# Patient Record
Sex: MALE | Race: WHITE | Employment: OTHER | URBAN - METROPOLITAN AREA
[De-identification: names, ages, dates, MRNs, and addresses within clinical notes are randomized per-mention and may not be internally consistent; named-entity substitution may affect disease eponyms.]

---

## 2017-03-27 ENCOUNTER — OFFICE VISIT (OUTPATIENT)
Dept: CARDIOLOGY CLINIC | Age: 77
End: 2017-03-27

## 2017-03-27 VITALS
SYSTOLIC BLOOD PRESSURE: 119 MMHG | BODY MASS INDEX: 29.25 KG/M2 | WEIGHT: 182 LBS | HEIGHT: 66 IN | HEART RATE: 53 BPM | DIASTOLIC BLOOD PRESSURE: 50 MMHG

## 2017-03-27 DIAGNOSIS — Z95.1 POSTSURGICAL AORTOCORONARY BYPASS STATUS: ICD-10-CM

## 2017-03-27 DIAGNOSIS — I25.10 CORONARY ARTERY DISEASE INVOLVING NATIVE CORONARY ARTERY OF NATIVE HEART WITHOUT ANGINA PECTORIS: Primary | ICD-10-CM

## 2017-03-27 DIAGNOSIS — I10 ESSENTIAL HYPERTENSION: ICD-10-CM

## 2017-03-27 DIAGNOSIS — E78.5 HYPERLIPIDEMIA, UNSPECIFIED HYPERLIPIDEMIA TYPE: ICD-10-CM

## 2017-03-27 DIAGNOSIS — Z98.61 POSTSURGICAL PERCUTANEOUS TRANSLUMINAL CORONARY ANGIOPLASTY STATUS: ICD-10-CM

## 2017-03-27 NOTE — PROGRESS NOTES
HISTORY OF PRESENT ILLNESS  Cheli Paulino is a 78 y.o. male. HPI Comments: Patient with cad,post cabg and pci,htn. On follow up patient denies any chest pains,sob, palpitation or other significant symptoms. Patient has numbness in left leg-neurology evaluation in progress. Valvular Heart Disease   Pertinent negatives include no chest pain and no shortness of breath. Review of Systems   Constitutional: Negative for chills, fever and malaise/fatigue. HENT: Negative for nosebleeds. Eyes: Negative for blurred vision and double vision. Respiratory: Negative for cough, hemoptysis, sputum production, shortness of breath and wheezing. Cardiovascular: Negative for chest pain, palpitations, orthopnea, claudication, leg swelling and PND. Gastrointestinal: Negative for heartburn, nausea and vomiting. Musculoskeletal: Negative for myalgias. Skin: Negative for rash. Neurological: Negative for dizziness and weakness. Endo/Heme/Allergies: Does not bruise/bleed easily.      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 failure     bp stable    CAD (coronary artery disease)     4 way bypass    Cough     Decreased hearing     right ear    Depression     Diabetes ([address]     Fatigue 9/5/2013    severe fatigue and sob for past few weeks r/o ichemia,cmp,anemia     HLD (hyperlipidemia)     HTN (hypertension)     Hypercholesterolemia     Hyperparathyroidism ([address]     Insomnia     Joint pain     Kidney stones     Low back pain     Lumbago     Lumbar spinal stenosis     Mild acid reflux     Mitral valve disorder     Obesity, unspecified     Patine has increased weight    Poliomyelitis     as a child with resulting right hand deformity    Postsurgical aortocoronary bypass status     Postsurgical percutaneous transluminal coronary angioplasty status     Stent of vein graft to RCA with YOSVANY/4/2008    Psychosexual dysfunction     Rosacea     stroke     x2  Jan 2006    Tricuspid valve disease     Vitamin D deficiency        Past Surgical History:   Procedure Laterality Date    HX APPENDECTOMY      HX CORONARY ARTERY BYPASS GRAFT      Four-vessel; and subsequent stents    HX CORONARY STENT PLACEMENT      Stent:VG to RCA and RT PDA beyond that/4/2008    HX HERNIA REPAIR      x3 left and right groin and epigastric area       Allergies   Allergen Reactions    Lisinopril Not Reported This Time    Morphine Other (comments)     GI distress    Niacin Rash and Itching    Zetia [Ezetimibe] Other (comments)     Acid reflux    Zocor [Simvastatin] Not Reported This Time       Current Outpatient Prescriptions   Medication Sig    cephALEXin (KEFLEX) 500 mg capsule Take 500 mg by mouth two (2) times a day.  traMADol (ULTRAM) 50 mg tablet Take 1 Tab by mouth two (2) times daily as needed for Pain (1 tab po bid prn pain greater than 5/10.). Max Daily Amount: 100 mg.    omega-3 fatty acids-vitamin e 1,000 mg cap 1,000 mg.  cholecalciferol, vitamin D3, (VITAMIN D3) 2,000 unit tab Take  by mouth daily.  aspirin 81 mg tablet Take 81 mg by mouth daily.  nebivolol (BYSTOLIC) 10 mg tablet Take 5 mg by mouth daily.  rosuvastatin (CRESTOR) 20 mg tablet Take 20 mg by mouth nightly.  famotidine (PEPCID) 40 mg tablet Take 40 mg by mouth as needed.  DOCOSAHEXANOIC ACID/EPA (FISH OIL PO) Take 1,000 mg by mouth two (2) times a day.  gabapentin (NEURONTIN) 300 mg tablet Take  by mouth three (3) times daily.  clopidogrel (PLAVIX) 75 mg tablet Take  by mouth daily. No current facility-administered medications for this visit.         Visit Vitals    /50    Pulse (!) 53    Ht 5' 6\" (1.676 m)    Wt 82.6 kg (182 lb)    BMI 29.38 kg/m2         Physical Exam   Constitutional: He is oriented to person, place, and time. He appears well-developed and well-nourished. obese   HENT:   Head: Normocephalic and atraumatic. Eyes: Conjunctivae are normal.   Neck: Neck supple. No JVD present. No tracheal deviation present. No thyromegaly present. Cardiovascular: Normal rate, regular rhythm and normal heart sounds. Exam reveals no gallop and no friction rub. No murmur heard. Pulmonary/Chest: Breath sounds normal. No respiratory distress. He has no wheezes. He has no rales. He exhibits no tenderness. Abdominal: Soft. There is no tenderness. Musculoskeletal: He exhibits no edema. Neurological: He is alert and oriented to person, place, and time. Skin: Skin is warm and dry. Psychiatric: He has a normal mood and affect.      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 failure     bp stable    CAD (coronary artery disease)     4 way bypass    Cough     Decreased hearing     right ear    Depression     Diabetes ([address]     Fatigue 9/5/2013    severe fatigue and sob for past few weeks r/o ichemia,cmp,anemia     HLD (hyperlipidemia)     HTN (hypertension)     Hypercholesterolemia     Hyperparathyroidism ([address]     Insomnia     Joint pain     Kidney stones     Low back pain     Lumbago     Lumbar spinal stenosis     Mild acid reflux     Mitral valve disorder     Obesity, unspecified     Patine has increased weight    Poliomyelitis     as a child with resulting right hand deformity    Postsurgical aortocoronary bypass status     Postsurgical percutaneous transluminal coronary angioplasty status     Stent of vein graft to RCA with YOSVANY/4/2008    Psychosexual dysfunction     Rosacea     stroke     x2  Jan 2006    Tricuspid valve disease     Vitamin D deficiency Past Surgical History:   Procedure Laterality Date    HX APPENDECTOMY      HX CORONARY ARTERY BYPASS GRAFT      Four-vessel; and subsequent stents    HX CORONARY STENT PLACEMENT      Stent:VG to RCA and RT PDA beyond that/4/2008    HX HERNIA REPAIR      x3 left and right groin and epigastric area       Social History   Substance Use Topics    Smoking status: Former Smoker     Packs/day: 1.00     Years: 24.00     Quit date: 1/1/1985    Smokeless tobacco: Never Used    Alcohol use 6.0 oz/week     12 Cans of beer per week       Allergies   Allergen Reactions    Lisinopril Not Reported This Time    Morphine Other (comments)     GI distress    Niacin Rash and Itching    Zetia [Ezetimibe] Other (comments)     Acid reflux    Zocor [Simvastatin] Not Reported This Time       Current Outpatient Prescriptions   Medication Sig    cephALEXin (KEFLEX) 500 mg capsule Take 500 mg by mouth two (2) times a day.  traMADol (ULTRAM) 50 mg tablet Take 1 Tab by mouth two (2) times daily as needed for Pain (1 tab po bid prn pain greater than 5/10.). Max Daily Amount: 100 mg.    omega-3 fatty acids-vitamin e 1,000 mg cap 1,000 mg.  cholecalciferol, vitamin D3, (VITAMIN D3) 2,000 unit tab Take  by mouth daily.  aspirin 81 mg tablet Take 81 mg by mouth daily.  nebivolol (BYSTOLIC) 10 mg tablet Take 5 mg by mouth daily.  rosuvastatin (CRESTOR) 20 mg tablet Take 20 mg by mouth nightly.  famotidine (PEPCID) 40 mg tablet Take 40 mg by mouth as needed.  DOCOSAHEXANOIC ACID/EPA (FISH OIL PO) Take 1,000 mg by mouth two (2) times a day.  gabapentin (NEURONTIN) 300 mg tablet Take  by mouth three (3) times daily.  clopidogrel (PLAVIX) 75 mg tablet Take  by mouth daily. No current facility-administered medications for this visit. Mr. Bari Gutierrez has a reminder for a \"due or due soon\" health maintenance.  I have asked that he contact his primary care provider for follow-up on this health maintenance. CARDIOLOGY STUDIES 9/9/2013 6/1/2010 4/1/2008 1/1/2007 1/1/2006   Myocardial Perfusion Scan Result prob normal,inf fixed with normal wall motion and ef - - - -   Cardiac Cath Result - - - - Bypass x's 4   Cardiac Cath with PCI Result - - LVEDP 15, Taxus stent in saphenous vein graft to RCA posterior descending artery in prox segment - -   Exercise Nuclear Stress Test Result - probably normal, mild fixed inf-basal s/o attenuation - - -   Echocardiogram - Complete Result normal ef,dd,trace to mild mr,tr - - normal ef, negative bubble study -         Assessment       ICD-10-CM ICD-9-CM    1. Coronary artery disease involving native coronary artery of native heart without angina pectoris I25.10 414.01     stable   2. Essential hypertension I10 401.9     controlled   3. Postsurgical aortocoronary bypass status Z95.1 V45.81     stable   4. Hyperlipidemia, unspecified hyperlipidemia type E78.5 272.4     stable   5. Postsurgical percutaneous transluminal coronary angioplasty status Z98.61 V45.82        Medications Discontinued During This Encounter   Medication Reason    traMADol (ULTRAM) 50 mg tablet Duplicate Order       No orders of the defined types were placed in this encounter. Follow-up Disposition:  Return in about 6 months (around 9/27/2017).

## 2017-03-27 NOTE — PROGRESS NOTES
1. Have you been to the ER, urgent care clinic since your last visit? Hospitalized since your last visit?     no  2. Have you seen or consulted any other health care providers outside of the [address] [place] [address] since your last visit? Include any pap smears or colon screening. Yes Where: pcp     3. Since your last visit, have you had any of the following symptoms? no       4. Have you had any blood work, X-rays or cardiac testing? No       5. Where do you normally have your labs drawn?   pcp  6. Do you need any refills today?    no

## 2017-03-27 NOTE — LETTER
Bhargavi Hernandez 1940 
 
3/27/2017 Dear DO Kendal Walker MD 
 
I had the pleasure of evaluating  Mr. Wojciech Hernandez in office today. Below are the relevant portions of my assessment and plan of care. ICD-10-CM ICD-9-CM 1. Coronary artery disease involving native coronary artery of native heart without angina pectoris I25.10 414.01   
 stable 2. Essential hypertension I10 401.9   
 controlled 3. Postsurgical aortocoronary bypass status Z95.1 V45.81   
 stable 4. Hyperlipidemia, unspecified hyperlipidemia type E78.5 272.4   
 stable 5. Postsurgical percutaneous transluminal coronary angioplasty status Z98.61 V45.82 Current Outpatient Prescriptions Medication Sig Dispense Refill  cephALEXin (KEFLEX) 500 mg capsule Take 500 mg by mouth two (2) times a day.  traMADol (ULTRAM) 50 mg tablet Take 1 Tab by mouth two (2) times daily as needed for Pain (1 tab po bid prn pain greater than 5/10.). Max Daily Amount: 100 mg. 30 Tab 0  
 omega-3 fatty acids-vitamin e 1,000 mg cap 1,000 mg.  cholecalciferol, vitamin D3, (VITAMIN D3) 2,000 unit tab Take  by mouth daily.  aspirin 81 mg tablet Take 81 mg by mouth daily.  nebivolol (BYSTOLIC) 10 mg tablet Take 5 mg by mouth daily.  rosuvastatin (CRESTOR) 20 mg tablet Take 20 mg by mouth nightly.  famotidine (PEPCID) 40 mg tablet Take 40 mg by mouth as needed.  DOCOSAHEXANOIC ACID/EPA (FISH OIL PO) Take 1,000 mg by mouth two (2) times a day.  gabapentin (NEURONTIN) 300 mg tablet Take  by mouth three (3) times daily.  clopidogrel (PLAVIX) 75 mg tablet Take  by mouth daily. No orders of the defined types were placed in this encounter. If you have questions, please do not hesitate to call me. I look forward to following Mr. Wojciech Hernnadez along with you. Sincerely, Salazar Camarillo MD

## 2017-03-27 NOTE — MR AVS SNAPSHOT
Visit Information Date & Time Provider Department Dept. Phone Encounter #  
 3/27/2017  8:30 AM Jasper Warner MD [facility] [place] [id number] Follow-up Instructions Return in about 6 months (around 9/27/2017). Upcoming Health Maintenance Date Due DTaP/Tdap/Td series (1 - Tdap) 9/3/1963 FOBT Q 1 YEAR AGE 50-75 9/3/1992 ZOSTER VACCINE AGE 60> 9/3/2002 GLAUCOMA SCREENING Q2Y 9/3/2007 Pneumococcal 65+ Low/Medium Risk (1 of 2 - PCV13) 9/3/2007 MEDICARE YEARLY EXAM 9/3/2007 INFLUENZA AGE 9 TO ADULT 8/1/2016 Allergies as of 3/27/2017  Review Complete On: 3/27/2017 By: Jasper Warner MD  
  
 Severity Noted Reaction Type Reactions Lisinopril    Not Reported This Time Morphine    Other (comments) GI distress Niacin    Rash, Itching Zetia [Ezetimibe]    Other (comments) Acid reflux Zocor [Simvastatin]    Not Reported This Time Current Immunizations  Never Reviewed No immunizations on file. Not reviewed this visit You Were Diagnosed With   
  
 Codes Comments Coronary artery disease involving native coronary artery of native heart without angina pectoris    -  Primary ICD-10-CM: I25.10 ICD-9-CM: 414.01 stable Essential hypertension     ICD-10-CM: I10 
ICD-9-CM: 401.9 controlled Postsurgical aortocoronary bypass status     ICD-10-CM: Z95.1 ICD-9-CM: V45.81 stable Hyperlipidemia, unspecified hyperlipidemia type     ICD-10-CM: E78.5 ICD-9-CM: 272.4 stable Postsurgical percutaneous transluminal coronary angioplasty status     ICD-10-CM: Z98.61 ICD-9-CM: V45.82 Vitals BP Pulse Height(growth percentile) Weight(growth percentile) BMI Smoking Status 119/50 (!) 53 5' 6\" (1.676 m) 182 lb (82.6 kg) 29.38 kg/m2 Former Smoker Vitals History BMI and BSA Data Body Mass Index Body Surface Area  
 29.38 kg/m 2 1.96 m 2 Preferred Pharmacy Pharmacy Name Phone [facility] [address] [place] [address] Your Updated Medication List  
  
   
This list is accurate as of: 3/27/17  8:39 AM.  Always use your most recent med list.  
  
  
  
  
 aspirin 81 mg tablet Take 81 mg by mouth daily. BYSTOLIC 10 mg tablet Generic drug:  nebivolol Take 5 mg by mouth daily. cephALEXin 500 mg capsule Commonly known as:  Jairo Aury Take 500 mg by mouth two (2) times a day. CRESTOR 20 mg tablet Generic drug:  rosuvastatin Take 20 mg by mouth nightly. famotidine 40 mg tablet Commonly known as:  PEPCID Take 40 mg by mouth as needed. FISH OIL PO Take 1,000 mg by mouth two (2) times a day.  
  
 gabapentin 300 mg tablet Commonly known as:  NEURONTIN Take  by mouth three (3) times daily. omega-3 fatty acids-vitamin e 1,000 mg Cap  
1,000 mg. PLAVIX 75 mg Tab Generic drug:  clopidogrel Take  by mouth daily. traMADol 50 mg tablet Commonly known as:  ULTRAM  
Take 1 Tab by mouth two (2) times daily as needed for Pain (1 tab po bid prn pain greater than 5/10.). Max Daily Amount: 100 mg. VITAMIN D3 2,000 unit Tab Generic drug:  cholecalciferol (vitamin D3) Take  by mouth daily. Follow-up Instructions Return in about 6 months (around 9/27/2017). [facility] & HEALTH SERVICES! Dear Shalonda Felipe: Thank you for requesting a Milyoni account. Our records indicate that you already have an active Milyoni account. You can access your account anytime at [email or web address] Did you know that you can access your hospital and ER discharge instructions at any time in Milyoni? You can also review all of your test results from your hospital stay or ER visit. Additional Information If you have questions, please visit the Frequently Asked Questions section of the Milyoni website at [email or web address] Remember, [employer] is NOT to be used for urgent needs. For medical emergencies, dial 911. Now available from your iPhone and Android! Please provide this summary of care documentation to your next provider. Your primary care clinician is listed as Donita Walker. If you have any questions after today's visit, please call [phone].

## 2017-09-26 ENCOUNTER — OFFICE VISIT (OUTPATIENT)
Dept: CARDIOLOGY CLINIC | Age: 77
End: 2017-09-26

## 2017-09-26 VITALS
SYSTOLIC BLOOD PRESSURE: 127 MMHG | HEART RATE: 64 BPM | WEIGHT: 184 LBS | DIASTOLIC BLOOD PRESSURE: 57 MMHG | HEIGHT: 66 IN | BODY MASS INDEX: 29.57 KG/M2

## 2017-09-26 DIAGNOSIS — I25.10 CORONARY ARTERY DISEASE INVOLVING NATIVE CORONARY ARTERY OF NATIVE HEART WITHOUT ANGINA PECTORIS: Primary | ICD-10-CM

## 2017-09-26 DIAGNOSIS — E78.5 HYPERLIPIDEMIA, UNSPECIFIED HYPERLIPIDEMIA TYPE: ICD-10-CM

## 2017-09-26 DIAGNOSIS — Z98.61 POSTSURGICAL PERCUTANEOUS TRANSLUMINAL CORONARY ANGIOPLASTY STATUS: ICD-10-CM

## 2017-09-26 DIAGNOSIS — Z95.1 POSTSURGICAL AORTOCORONARY BYPASS STATUS: ICD-10-CM

## 2017-09-26 DIAGNOSIS — I10 ESSENTIAL HYPERTENSION: ICD-10-CM

## 2017-09-26 RX ORDER — LOSARTAN POTASSIUM 100 MG/1
100 TABLET ORAL DAILY
COMMUNITY

## 2017-09-26 NOTE — LETTER
Sahra Romeo 1940 
 
9/26/2017 Dear DO Robby Gaspar MD 
 
I had the pleasure of evaluating  Mr. Garrick Contreras in office today. Below are the relevant portions of my assessment and plan of care. ICD-10-CM ICD-9-CM 1. Coronary artery disease involving native coronary artery of native heart without angina pectoris I25.10 414.01   
 stable 2. Essential hypertension I10 401.9   
 controlled 3. Postsurgical aortocoronary bypass status Z95.1 V45.81   
4. Hyperlipidemia, unspecified hyperlipidemia type E78.5 272.4   
 stable 
lab done with pcp 5. Postsurgical percutaneous transluminal coronary angioplasty status Z98.61 V45.82   
 stable Current Outpatient Prescriptions Medication Sig Dispense Refill  losartan (COZAAR) 100 mg tablet Take 100 mg by mouth daily.  cephALEXin (KEFLEX) 500 mg capsule Take 500 mg by mouth two (2) times a day.  traMADol (ULTRAM) 50 mg tablet Take 1 Tab by mouth two (2) times daily as needed for Pain (1 tab po bid prn pain greater than 5/10.). Max Daily Amount: 100 mg. 30 Tab 0  
 omega-3 fatty acids-vitamin e 1,000 mg cap 1,000 mg.  cholecalciferol, vitamin D3, (VITAMIN D3) 2,000 unit tab Take  by mouth daily.  aspirin 81 mg tablet Take 81 mg by mouth daily.  nebivolol (BYSTOLIC) 10 mg tablet Take 5 mg by mouth daily.  rosuvastatin (CRESTOR) 20 mg tablet Take 20 mg by mouth nightly.  gabapentin (NEURONTIN) 300 mg tablet Take  by mouth three (3) times daily.  clopidogrel (PLAVIX) 75 mg tablet Take  by mouth daily. Orders Placed This Encounter  losartan (COZAAR) 100 mg tablet Sig: Take 100 mg by mouth daily. If you have questions, please do not hesitate to call me. I look forward to following Mr. Garrick Contreras along with you. Sincerely, Michael Werner MD

## 2017-09-26 NOTE — MR AVS SNAPSHOT
Visit Information Date & Time Provider Department Dept. Phone Encounter #  
 9/26/2017  8:30 AM Clarissa Marcelo MD [facility] [place] [id number] Follow-up Instructions Return in about 6 months (around 3/26/2018). Upcoming Health Maintenance Date Due DTaP/Tdap/Td series (1 - Tdap) 9/3/1963 FOBT Q 1 YEAR AGE 50-75 9/3/1992 ZOSTER VACCINE AGE 60> 7/3/2002 GLAUCOMA SCREENING Q2Y 9/3/2007 Pneumococcal 65+ Low/Medium Risk (1 of 2 - PCV13) 9/3/2007 MEDICARE YEARLY EXAM 9/3/2007 INFLUENZA AGE 9 TO ADULT 8/1/2017 Allergies as of 9/26/2017  Review Complete On: 9/26/2017 By: Clarissa Marcelo MD  
  
 Severity Noted Reaction Type Reactions Lisinopril    Not Reported This Time Morphine    Other (comments) GI distress Niacin    Rash, Itching Zetia [Ezetimibe]    Other (comments) Acid reflux Zocor [Simvastatin]    Not Reported This Time Current Immunizations  Never Reviewed No immunizations on file. Not reviewed this visit You Were Diagnosed With   
  
 Codes Comments Coronary artery disease involving native coronary artery of native heart without angina pectoris    -  Primary ICD-10-CM: I25.10 ICD-9-CM: 414.01 stable Essential hypertension     ICD-10-CM: I10 
ICD-9-CM: 401.9 controlled Postsurgical aortocoronary bypass status     ICD-10-CM: Z95.1 ICD-9-CM: V45.81 Hyperlipidemia, unspecified hyperlipidemia type     ICD-10-CM: E78.5 ICD-9-CM: 272.4 stable 
lab done with pcp Postsurgical percutaneous transluminal coronary angioplasty status     ICD-10-CM: Z98.61 ICD-9-CM: V45.82 stable Vitals BP Pulse Height(growth percentile) Weight(growth percentile) BMI Smoking Status 127/57 64 5' 6\" (1.676 m) 184 lb (83.5 kg) 29.7 kg/m2 Former Smoker Vitals History BMI and BSA Data Body Mass Index Body Surface Area  
 29.7 kg/m 2 1.97 m 2 Preferred Pharmacy Pharmacy Name Phone [facility] Your Updated Medication List  
  
   
This list is accurate as of: 9/26/17  8:42 AM.  Always use your most recent med list.  
  
  
  
  
 aspirin 81 mg tablet Take 81 mg by mouth daily. BYSTOLIC 10 mg tablet Generic drug:  nebivolol Take 5 mg by mouth daily. cephALEXin 500 mg capsule Commonly known as:  Wash Skains Take 500 mg by mouth two (2) times a day. CRESTOR 20 mg tablet Generic drug:  rosuvastatin Take 20 mg by mouth nightly.  
  
 gabapentin 300 mg tablet Commonly known as:  NEURONTIN Take  by mouth three (3) times daily. losartan 100 mg tablet Commonly known as:  COZAAR Take 100 mg by mouth daily. omega-3 fatty acids-vitamin e 1,000 mg Cap  
1,000 mg. PLAVIX 75 mg Tab Generic drug:  clopidogrel Take  by mouth daily. traMADol 50 mg tablet Commonly known as:  ULTRAM  
Take 1 Tab by mouth two (2) times daily as needed for Pain (1 tab po bid prn pain greater than 5/10.). Max Daily Amount: 100 mg. VITAMIN D3 2,000 unit Tab Generic drug:  cholecalciferol (vitamin D3) Take  by mouth daily. Follow-up Instructions Return in about 6 months (around 3/26/2018). Introducing [facility] HEALTH SERVICES! Dear Ramakrishna Gómez: Thank you for requesting a Mimoona account. Our records indicate that you already have an active Mimoona account. You can access your account anytime at [email or web address] Did you know that you can access your hospital and ER discharge instructions at any time in Mimoona? You can also review all of your test results from your hospital stay or ER visit. Additional Information If you have questions, please visit the Frequently Asked Questions section of the Mimoona website at [email or web address] Remember, Mimoona is NOT to be used for urgent needs.  For medical emergencies, dial 911. Now available from your iPhone and Android! Please provide this summary of care documentation to your next provider. Your primary care clinician is listed as Dory Tipton. If you have any questions after today's visit, please call [phone].

## 2017-09-26 NOTE — PROGRESS NOTES
1. Have you been to the ER, urgent care clinic since your last visit? Hospitalized since your last visit?no    2. Have you seen or consulted any other health care providers outside of the [address] [place] [address] since your last visit? Include any pap smears or colon screening.  yes

## 2017-09-26 NOTE — PROGRESS NOTES
HISTORY OF PRESENT ILLNESS  Shiela Okeefe is a 78 y.o. male. HPI Comments: Patient with cad,post cabg and pci,htn. On follow up patient denies any chest pains,sob, palpitation or other significant symptoms. Review of Systems   Constitutional: Negative for chills, fever and malaise/fatigue. HENT: Negative for nosebleeds. Eyes: Negative for blurred vision and double vision. Respiratory: Negative for cough, hemoptysis, sputum production and wheezing. Cardiovascular: Negative for palpitations, orthopnea, claudication, leg swelling and PND. Gastrointestinal: Negative for heartburn, nausea and vomiting. Musculoskeletal: Negative for myalgias. Skin: Negative for rash. Neurological: Negative for dizziness and weakness. Endo/Heme/Allergies: Does not bruise/bleed easily.      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 failure     bp stable    CAD (coronary artery disease)     4 way bypass    Cough     Decreased hearing     right ear    Depression     Diabetes ([address]     Fatigue 9/5/2013    severe fatigue and sob for past few weeks r/o ichemia,cmp,anemia     HLD (hyperlipidemia)     HTN (hypertension)     Hypercholesterolemia     Hyperparathyroidism ([address]     Insomnia     Joint pain     Kidney stones     Low back pain     Lumbago     Lumbar spinal stenosis     Mild acid reflux     Mitral valve disorder     Obesity, unspecified     Patine has increased weight    Poliomyelitis     as a child with resulting right hand deformity    Postsurgical aortocoronary bypass status     Postsurgical percutaneous transluminal coronary angioplasty status     Stent of vein graft to RCA with YOSVANY/4/2008    Psychosexual dysfunction     Rosacea     stroke     x2  Jan 2006    Tricuspid valve disease     Vitamin D deficiency        Past Surgical History:   Procedure Laterality Date    HX APPENDECTOMY      HX CORONARY ARTERY BYPASS GRAFT      Four-vessel; and subsequent stents    HX CORONARY STENT PLACEMENT      Stent:VG to RCA and RT PDA beyond that/4/2008    HX HERNIA REPAIR      x3 left and right groin and epigastric area       Allergies   Allergen Reactions    Lisinopril Not Reported This Time    Morphine Other (comments)     GI distress    Niacin Rash and Itching    Zetia [Ezetimibe] Other (comments)     Acid reflux    Zocor [Simvastatin] Not Reported This Time       Current Outpatient Prescriptions   Medication Sig    losartan (COZAAR) 100 mg tablet Take 100 mg by mouth daily.  cephALEXin (KEFLEX) 500 mg capsule Take 500 mg by mouth two (2) times a day.  traMADol (ULTRAM) 50 mg tablet Take 1 Tab by mouth two (2) times daily as needed for Pain (1 tab po bid prn pain greater than 5/10.). Max Daily Amount: 100 mg.    omega-3 fatty acids-vitamin e 1,000 mg cap 1,000 mg.  cholecalciferol, vitamin D3, (VITAMIN D3) 2,000 unit tab Take  by mouth daily.  aspirin 81 mg tablet Take 81 mg by mouth daily.  nebivolol (BYSTOLIC) 10 mg tablet Take 5 mg by mouth daily.  rosuvastatin (CRESTOR) 20 mg tablet Take 20 mg by mouth nightly.  gabapentin (NEURONTIN) 300 mg tablet Take  by mouth three (3) times daily.  clopidogrel (PLAVIX) 75 mg tablet Take  by mouth daily. No current facility-administered medications for this visit. Visit Vitals    /57    Pulse 64    Ht 5' 6\" (1.676 m)    Wt 83.5 kg (184 lb)    BMI 29.7 kg/m2         Physical Exam   Constitutional: He is oriented to person, place, and time. He appears well-developed and well-nourished. obese   HENT:   Head: Normocephalic and atraumatic. Eyes: Conjunctivae are normal.   Neck: Neck supple. No JVD present. No tracheal deviation present. No thyromegaly present.    Cardiovascular: Normal rate, regular rhythm and normal heart sounds. Exam reveals no gallop and no friction rub. No murmur heard. Pulmonary/Chest: Breath sounds normal. No respiratory distress. He has no wheezes. He has no rales. He exhibits no tenderness. Abdominal: Soft. There is no tenderness. Musculoskeletal: He exhibits no edema. Neurological: He is alert and oriented to person, place, and time. Skin: Skin is warm and dry. Psychiatric: He has a normal mood and affect.      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 failure     bp stable    CAD (coronary artery disease)     4 way bypass    Cough     Decreased hearing     right ear    Depression     Diabetes ([address]     Fatigue 9/5/2013    severe fatigue and sob for past few weeks r/o ichemia,cmp,anemia     HLD (hyperlipidemia)     HTN (hypertension)     Hypercholesterolemia     Hyperparathyroidism ([address]     Insomnia     Joint pain     Kidney stones     Low back pain     Lumbago     Lumbar spinal stenosis     Mild acid reflux     Mitral valve disorder     Obesity, unspecified     Patine has increased weight    Poliomyelitis     as a child with resulting right hand deformity    Postsurgical aortocoronary bypass status     Postsurgical percutaneous transluminal coronary angioplasty status     Stent of vein graft to RCA with YOSVANY/4/2008    Psychosexual dysfunction     Rosacea     stroke     x2  Jan 2006    Tricuspid valve disease     Vitamin D deficiency        Past Surgical History:   Procedure Laterality Date    HX APPENDECTOMY      HX CORONARY ARTERY BYPASS GRAFT      Four-vessel; and subsequent stents    HX CORONARY STENT PLACEMENT      Stent:VG to RCA and RT PDA beyond that/4/2008    HX HERNIA REPAIR      x3 left and right groin and epigastric area       Social History   Substance Use Topics    Smoking status: Former Smoker     Packs/day: 1.00     Years: 24.00     Quit date: 1/1/1985    Smokeless tobacco: Never Used    Alcohol use 6.0 oz/week     12 Cans of beer per week       Allergies   Allergen Reactions    Lisinopril Not Reported This Time    Morphine Other (comments)     GI distress    Niacin Rash and Itching    Zetia [Ezetimibe] Other (comments)     Acid reflux    Zocor [Simvastatin] Not Reported This Time       Current Outpatient Prescriptions   Medication Sig    losartan (COZAAR) 100 mg tablet Take 100 mg by mouth daily.  cephALEXin (KEFLEX) 500 mg capsule Take 500 mg by mouth two (2) times a day.  traMADol (ULTRAM) 50 mg tablet Take 1 Tab by mouth two (2) times daily as needed for Pain (1 tab po bid prn pain greater than 5/10.). Max Daily Amount: 100 mg.    omega-3 fatty acids-vitamin e 1,000 mg cap 1,000 mg.  cholecalciferol, vitamin D3, (VITAMIN D3) 2,000 unit tab Take  by mouth daily.  aspirin 81 mg tablet Take 81 mg by mouth daily.  nebivolol (BYSTOLIC) 10 mg tablet Take 5 mg by mouth daily.  rosuvastatin (CRESTOR) 20 mg tablet Take 20 mg by mouth nightly.  gabapentin (NEURONTIN) 300 mg tablet Take  by mouth three (3) times daily.  clopidogrel (PLAVIX) 75 mg tablet Take  by mouth daily. No current facility-administered medications for this visit. Mr. Marino Shields has a reminder for a \"due or due soon\" health maintenance. I have asked that he contact his primary care provider for follow-up on this health maintenance.       CARDIOLOGY STUDIES 9/9/2013 6/1/2010 4/1/2008 1/1/2007 1/1/2006   Myocardial Perfusion Scan Result prob normal,inf fixed with normal wall motion and ef - - - -   Cardiac Cath Result - - - - Bypass x's 4   Cardiac Cath with PCI Result - - LVEDP 15, Taxus stent in saphenous vein graft to RCA posterior descending artery in prox segment - -   Exercise Nuclear Stress Test Result - probably normal, mild fixed inf-basal s/o attenuation - - -   Echocardiogram - Complete Result normal ef,dd,trace to mild mr,tr - - normal ef, negative bubble study -   Some recent data might be hidden         Assessment       ICD-10-CM ICD-9-CM    1. Coronary artery disease involving native coronary artery of native heart without angina pectoris I25.10 414.01     stable   2. Essential hypertension I10 401.9     controlled   3. Postsurgical aortocoronary bypass status Z95.1 V45.81    4. Hyperlipidemia, unspecified hyperlipidemia type E78.5 272.4     stable  lab done with pcp   5. Postsurgical percutaneous transluminal coronary angioplasty status Z98.61 V45.82     stable         No orders of the defined types were placed in this encounter. Follow-up Disposition:  Return in about 6 months (around 3/26/2018).

## 2018-03-20 ENCOUNTER — OFFICE VISIT (OUTPATIENT)
Dept: CARDIOLOGY CLINIC | Age: 78
End: 2018-03-20

## 2018-03-20 VITALS
WEIGHT: 187 LBS | HEIGHT: 66 IN | SYSTOLIC BLOOD PRESSURE: 149 MMHG | BODY MASS INDEX: 30.05 KG/M2 | HEART RATE: 54 BPM | DIASTOLIC BLOOD PRESSURE: 76 MMHG

## 2018-03-20 DIAGNOSIS — Z95.1 POSTSURGICAL AORTOCORONARY BYPASS STATUS: ICD-10-CM

## 2018-03-20 DIAGNOSIS — E78.5 HYPERLIPIDEMIA, UNSPECIFIED HYPERLIPIDEMIA TYPE: ICD-10-CM

## 2018-03-20 DIAGNOSIS — Z98.61 POSTSURGICAL PERCUTANEOUS TRANSLUMINAL CORONARY ANGIOPLASTY STATUS: ICD-10-CM

## 2018-03-20 DIAGNOSIS — I25.10 CORONARY ARTERY DISEASE INVOLVING NATIVE CORONARY ARTERY OF NATIVE HEART WITHOUT ANGINA PECTORIS: Primary | ICD-10-CM

## 2018-03-20 DIAGNOSIS — I10 ESSENTIAL HYPERTENSION: ICD-10-CM

## 2018-03-20 NOTE — PROGRESS NOTES
HISTORY OF PRESENT ILLNESS  Eitan Parada is a 78 y.o. male. HPI Comments: Patient with cad,post cabg and pci,htn. On follow up patient denies any chest pains,sob, palpitation or other significant symptoms. Review of Systems   Constitutional: Negative for chills, fever and malaise/fatigue. HENT: Negative for nosebleeds. Eyes: Negative for blurred vision and double vision. Respiratory: Negative for cough, hemoptysis, sputum production and wheezing. Cardiovascular: Negative for palpitations, orthopnea, claudication, leg swelling and PND. Gastrointestinal: Negative for heartburn, nausea and vomiting. Musculoskeletal: Negative for myalgias. Skin: Negative for rash. Neurological: Negative for dizziness and weakness. Endo/Heme/Allergies: Does not bruise/bleed easily.      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 failure     bp stable    CAD (coronary artery disease)     4 way bypass    Cough     Decreased hearing     right ear    Depression     Diabetes ([address]     Fatigue 9/5/2013    severe fatigue and sob for past few weeks r/o ichemia,cmp,anemia     HLD (hyperlipidemia)     HTN (hypertension)     Hypercholesterolemia     Hyperparathyroidism ([address]     Insomnia     Joint pain     Kidney stones     Low back pain     Lumbago     Lumbar spinal stenosis     Mild acid reflux     Mitral valve disorder     Obesity, unspecified     Patine has increased weight    Poliomyelitis     as a child with resulting right hand deformity    Postsurgical aortocoronary bypass status     Postsurgical percutaneous transluminal coronary angioplasty status     Stent of vein graft to RCA with YOSVANY/4/2008    Psychosexual dysfunction     Rosacea     stroke     x2  Jan 2006    Tricuspid valve disease     Vitamin D deficiency        Past Surgical History:   Procedure Laterality Date    HX APPENDECTOMY      HX CORONARY ARTERY BYPASS GRAFT      Four-vessel; and subsequent stents    HX CORONARY STENT PLACEMENT      Stent:VG to RCA and RT PDA beyond that/4/2008    HX HERNIA REPAIR      x3 left and right groin and epigastric area       Allergies   Allergen Reactions    Lisinopril Not Reported This Time    Morphine Other (comments)     GI distress    Niacin Rash and Itching    Zetia [Ezetimibe] Other (comments)     Acid reflux    Zocor [Simvastatin] Not Reported This Time       Current Outpatient Prescriptions   Medication Sig    losartan (COZAAR) 100 mg tablet Take 100 mg by mouth daily.  cephALEXin (KEFLEX) 500 mg capsule Take 500 mg by mouth two (2) times a day.  traMADol (ULTRAM) 50 mg tablet Take 1 Tab by mouth two (2) times daily as needed for Pain (1 tab po bid prn pain greater than 5/10.). Max Daily Amount: 100 mg.    omega-3 fatty acids-vitamin e 1,000 mg cap 1,000 mg.  cholecalciferol, vitamin D3, (VITAMIN D3) 2,000 unit tab Take  by mouth daily.  nebivolol (BYSTOLIC) 10 mg tablet Take 5 mg by mouth daily.  rosuvastatin (CRESTOR) 20 mg tablet Take 20 mg by mouth nightly.  gabapentin (NEURONTIN) 300 mg tablet Take  by mouth three (3) times daily.  clopidogrel (PLAVIX) 75 mg tablet Take  by mouth daily.  aspirin 81 mg tablet Take 81 mg by mouth daily. No current facility-administered medications for this visit. Visit Vitals    /76    Pulse (!) 54    Ht 5' 6\" (1.676 m)    Wt 84.8 kg (187 lb)    BMI 30.18 kg/m2         Physical Exam   Constitutional: He is oriented to person, place, and time. He appears well-developed and well-nourished. obese   HENT:   Head: Normocephalic and atraumatic. Eyes: Conjunctivae are normal.   Neck: Neck supple. No JVD present. No tracheal deviation present. No thyromegaly present.    Cardiovascular: Normal rate, regular rhythm and normal heart sounds. Exam reveals no gallop and no friction rub. No murmur heard. Pulmonary/Chest: Breath sounds normal. No respiratory distress. He has no wheezes. He has no rales. He exhibits no tenderness. Abdominal: Soft. There is no tenderness. Musculoskeletal: He exhibits no edema. Neurological: He is alert and oriented to person, place, and time. Skin: Skin is warm and dry. Psychiatric: He has a normal mood and affect.      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 failure     bp stable    CAD (coronary artery disease)     4 way bypass    Cough     Decreased hearing     right ear    Depression     Diabetes ([address]     Fatigue 9/5/2013    severe fatigue and sob for past few weeks r/o ichemia,cmp,anemia     HLD (hyperlipidemia)     HTN (hypertension)     Hypercholesterolemia     Hyperparathyroidism ([address]     Insomnia     Joint pain     Kidney stones     Low back pain     Lumbago     Lumbar spinal stenosis     Mild acid reflux     Mitral valve disorder     Obesity, unspecified     Patine has increased weight    Poliomyelitis     as a child with resulting right hand deformity    Postsurgical aortocoronary bypass status     Postsurgical percutaneous transluminal coronary angioplasty status     Stent of vein graft to RCA with YOSVANY/4/2008    Psychosexual dysfunction     Rosacea     stroke     x2  Jan 2006    Tricuspid valve disease     Vitamin D deficiency        Past Surgical History:   Procedure Laterality Date    HX APPENDECTOMY      HX CORONARY ARTERY BYPASS GRAFT      Four-vessel; and subsequent stents    HX CORONARY STENT PLACEMENT      Stent:VG to RCA and RT PDA beyond that/4/2008    HX HERNIA REPAIR      x3 left and right groin and epigastric area       Social History   Substance Use Topics    Smoking status: Former Smoker     Packs/day: 1.00     Years: 24.00     Quit date: 1/1/1985    Smokeless tobacco: Never Used    Alcohol use 6.0 oz/week     12 Cans of beer per week       Allergies   Allergen Reactions    Lisinopril Not Reported This Time    Morphine Other (comments)     GI distress    Niacin Rash and Itching    Zetia [Ezetimibe] Other (comments)     Acid reflux    Zocor [Simvastatin] Not Reported This Time       Current Outpatient Prescriptions   Medication Sig    losartan (COZAAR) 100 mg tablet Take 100 mg by mouth daily.  cephALEXin (KEFLEX) 500 mg capsule Take 500 mg by mouth two (2) times a day.  traMADol (ULTRAM) 50 mg tablet Take 1 Tab by mouth two (2) times daily as needed for Pain (1 tab po bid prn pain greater than 5/10.). Max Daily Amount: 100 mg.    omega-3 fatty acids-vitamin e 1,000 mg cap 1,000 mg.  cholecalciferol, vitamin D3, (VITAMIN D3) 2,000 unit tab Take  by mouth daily.  nebivolol (BYSTOLIC) 10 mg tablet Take 5 mg by mouth daily.  rosuvastatin (CRESTOR) 20 mg tablet Take 20 mg by mouth nightly.  gabapentin (NEURONTIN) 300 mg tablet Take  by mouth three (3) times daily.  clopidogrel (PLAVIX) 75 mg tablet Take  by mouth daily.  aspirin 81 mg tablet Take 81 mg by mouth daily. No current facility-administered medications for this visit. Mr. Gilberto Hutchins has a reminder for a \"due or due soon\" health maintenance. I have asked that he contact his primary care provider for follow-up on this health maintenance.       CARDIOLOGY STUDIES 9/9/2013 6/1/2010 4/1/2008 1/1/2007 1/1/2006   Myocardial Perfusion Scan Result prob normal,inf fixed with normal wall motion and ef - - - -   Cardiac Cath Result - - - - Bypass x's 4   Cardiac Cath with PCI Result - - LVEDP 15, Taxus stent in saphenous vein graft to RCA posterior descending artery in prox segment - -   Exercise Nuclear Stress Test Result - probably normal, mild fixed inf-basal s/o attenuation - - -   Echocardiogram - Complete Result normal ef,dd,trace to mild mr,tr - - normal ef, negative bubble study -   Some recent data might be hidden         Assessment       ICD-10-CM ICD-9-CM    1. Coronary artery disease involving native coronary artery of native heart without angina pectoris I25.10 414.01     stable   2. Essential hypertension I10 401.9     mildly elevated  monitor   3. Postsurgical aortocoronary bypass status Z95.1 V45.81     stable   4. Hyperlipidemia, unspecified hyperlipidemia type E78.5 272.4     on statin  lab with pcp   5. Postsurgical percutaneous transluminal coronary angioplasty status Z98.61 V45.82          No orders of the defined types were placed in this encounter. Follow-up Disposition:  Return in about 6 months (around 9/20/2018).

## 2018-03-20 NOTE — LETTER
Jhonny Naik 1940 
 
3/20/2018 Dear DO Suresh Loco MD Gordy Flower, MD 
 
I had the pleasure of evaluating  Mr. Andres Boss in office today. Below are the relevant portions of my assessment and plan of care. ICD-10-CM ICD-9-CM 1. Coronary artery disease involving native coronary artery of native heart without angina pectoris I25.10 414.01   
 stable 2. Essential hypertension I10 401.9   
 mildly elevated 
monitor 3. Postsurgical aortocoronary bypass status Z95.1 V45.81   
 stable 4. Hyperlipidemia, unspecified hyperlipidemia type E78.5 272.4   
 on statin 
lab with pcp 5. Postsurgical percutaneous transluminal coronary angioplasty status Z98.61 V45.82 Current Outpatient Prescriptions Medication Sig Dispense Refill  losartan (COZAAR) 100 mg tablet Take 100 mg by mouth daily.  cephALEXin (KEFLEX) 500 mg capsule Take 500 mg by mouth two (2) times a day.  traMADol (ULTRAM) 50 mg tablet Take 1 Tab by mouth two (2) times daily as needed for Pain (1 tab po bid prn pain greater than 5/10.). Max Daily Amount: 100 mg. 30 Tab 0  
 omega-3 fatty acids-vitamin e 1,000 mg cap 1,000 mg.  cholecalciferol, vitamin D3, (VITAMIN D3) 2,000 unit tab Take  by mouth daily.  nebivolol (BYSTOLIC) 10 mg tablet Take 5 mg by mouth daily.  rosuvastatin (CRESTOR) 20 mg tablet Take 20 mg by mouth nightly.  gabapentin (NEURONTIN) 300 mg tablet Take  by mouth three (3) times daily.  clopidogrel (PLAVIX) 75 mg tablet Take  by mouth daily.  aspirin 81 mg tablet Take 81 mg by mouth daily. No orders of the defined types were placed in this encounter. If you have questions, please do not hesitate to call me. I look forward to following Mr. Andres Boss along with you. Sincerely, Rj Ervin MD

## 2018-03-20 NOTE — PROGRESS NOTES
1. Have you been to the ER, urgent care clinic since your last visit? Hospitalized since your last visit? No    2. Have you seen or consulted any other health care providers outside of the [address] [place] [address] since your last visit? Include any pap smears or colon screening.  Yes, pcp

## 2018-03-20 NOTE — MR AVS SNAPSHOT
[facility] 
 
 
 [address] [facility] 
[phone] Patient: Nishant Mckeon MRN: [id number] [facility]:[birth year] Visit Information Date & Time Provider Department Dept. Phone Encounter #  
 3/20/2018  8:30 AM Maurice Jose MD [facility] [phone] Follow-up Instructions Return in about 6 months (around 9/20/2018). Upcoming Health Maintenance Date Due DTaP/Tdap/Td series (1 - Tdap) 9/3/1963 ZOSTER VACCINE AGE 60> 7/3/2002 GLAUCOMA SCREENING Q2Y 9/3/2007 Pneumococcal 65+ Low/Medium Risk (1 of 2 - PCV13) 9/3/2007 Influenza Age 5 to Adult 8/1/2017 MEDICARE YEARLY EXAM 3/14/2018 Allergies as of 3/20/2018  Review Complete On: 3/20/2018 By: Maurice Jose MD  
  
 Severity Noted Reaction Type Reactions Lisinopril    Not Reported This Time Morphine    Other (comments) GI distress Niacin    Rash, Itching Zetia [Ezetimibe]    Other (comments) Acid reflux Zocor [Simvastatin]    Not Reported This Time Current Immunizations  Never Reviewed No immunizations on file. Not reviewed this visit You Were Diagnosed With   
  
 Codes Comments Coronary artery disease involving native coronary artery of native heart without angina pectoris    -  Primary ICD-10-CM: I25.10 ICD-9-CM: 414.01 stable Essential hypertension     ICD-10-CM: I10 
ICD-9-CM: 401.9 mildly elevated 
monitor Postsurgical aortocoronary bypass status     ICD-10-CM: Z95.1 ICD-9-CM: V45.81 stable Hyperlipidemia, unspecified hyperlipidemia type     ICD-10-CM: E78.5 ICD-9-CM: 272.4 on statin 
lab with pcp Postsurgical percutaneous transluminal coronary angioplasty status     ICD-10-CM: Z98.61 ICD-9-CM: V45.82 Vitals BP Pulse Height(growth percentile) Weight(growth percentile) BMI Smoking Status 149/76 (!) 54 5' 6\" (1.676 m) 187 lb (84.8 kg) 30.18 kg/m2 Former Smoker Vitals History BMI and BSA Data Body Mass Index Body Surface Area  
 30.18 kg/m 2 1.99 m 2 Preferred Pharmacy Pharmacy Name Phone [facility] [address] [facility] Your Updated Medication List  
  
   
This list is accurate as of 3/20/18  8:37 AM.  Always use your most recent med list.  
  
  
  
  
 aspirin 81 mg tablet Take 81 mg by mouth daily. BYSTOLIC 10 mg tablet Generic drug:  nebivolol Take 5 mg by mouth daily. cephALEXin 500 mg capsule Commonly known as:  Lore Sheets Take 500 mg by mouth two (2) times a day. CRESTOR 20 mg tablet Generic drug:  rosuvastatin Take 20 mg by mouth nightly.  
  
 gabapentin 300 mg tablet Commonly known as:  NEURONTIN Take  by mouth three (3) times daily. losartan 100 mg tablet Commonly known as:  COZAAR Take 100 mg by mouth daily. omega-3 fatty acids-vitamin e 1,000 mg Cap  
1,000 mg. PLAVIX 75 mg Tab Generic drug:  clopidogrel Take  by mouth daily. traMADol 50 mg tablet Commonly known as:  ULTRAM  
Take 1 Tab by mouth two (2) times daily as needed for Pain (1 tab po bid prn pain greater than 5/10.). Max Daily Amount: 100 mg. VITAMIN D3 2,000 unit Tab Generic drug:  cholecalciferol (vitamin D3) Take  by mouth daily. Follow-up Instructions Return in about 6 months (around 9/20/2018). Introducing [facility]! Dear Tiffanie Yoder: Thank you for requesting a [employer] account. Our records indicate that you already have an active [employer] account. You can access your account anytime at [email or web address] Did you know that you can access your hospital and ER discharge instructions at any time in [employer]? You can also review all of your test results from your hospital stay or ER visit. Additional Information If you have questions, please visit the Frequently Asked Questions section of the [employer] website at [email or web address] Remember, [employer] is NOT to be used for urgent needs. For medical emergencies, dial 911. Now available from your iPhone and Android! Please provide this summary of care documentation to your next provider. Your primary care clinician is listed as Yair Bolanos. If you have any questions after today's visit, please call [phone].

## 2018-09-25 ENCOUNTER — OFFICE VISIT (OUTPATIENT)
Dept: CARDIOLOGY CLINIC | Age: 78
End: 2018-09-25

## 2018-09-25 VITALS
HEIGHT: 66 IN | SYSTOLIC BLOOD PRESSURE: 116 MMHG | WEIGHT: 186 LBS | DIASTOLIC BLOOD PRESSURE: 54 MMHG | BODY MASS INDEX: 29.89 KG/M2 | HEART RATE: 58 BPM

## 2018-09-25 DIAGNOSIS — Z95.1 POSTSURGICAL AORTOCORONARY BYPASS STATUS: ICD-10-CM

## 2018-09-25 DIAGNOSIS — E78.5 HYPERLIPIDEMIA, UNSPECIFIED HYPERLIPIDEMIA TYPE: ICD-10-CM

## 2018-09-25 DIAGNOSIS — I11.9 BENIGN HYPERTENSIVE HEART DISEASE WITHOUT HEART FAILURE: ICD-10-CM

## 2018-09-25 DIAGNOSIS — Z98.61 POSTSURGICAL PERCUTANEOUS TRANSLUMINAL CORONARY ANGIOPLASTY STATUS: ICD-10-CM

## 2018-09-25 DIAGNOSIS — I25.10 CORONARY ARTERY DISEASE INVOLVING NATIVE CORONARY ARTERY OF NATIVE HEART WITHOUT ANGINA PECTORIS: Primary | ICD-10-CM

## 2018-09-25 NOTE — PROGRESS NOTES
1. Have you been to the ER, urgent care clinic since your last visit? Hospitalized since your last visit?     no  2. Have you seen or consulted any other health care providers outside of the [address] [place] [address] since your last visit? Include any pap smears or colon screening. Yes Where: pcp     3. Since your last visit, have you had any of the following symptoms?  no

## 2018-09-25 NOTE — MR AVS SNAPSHOT
[address] [facility] [address] 
 
 
 [address] [facility] [address] 
[phone] Patient: Marylene Seals MRN: [id number] MDY:[birth year] Visit Information Date & Time Provider Department Dept. Phone Encounter #  
 9/25/2018  8:30 AM Ros Zayas MD [facility] [phone] [id number] Follow-up Instructions Return in about 6 months (around 3/25/2019). Your Appointments 9/25/2018  8:30 AM  
Office Visit with Ros Zayas MD  
[facility] ([address]) Appt Note: 6 month follow up; ca/dg [address] [facility] [address]  
  
   
 [address] [address] [address] [address]  
  
    
 3/26/2019  8:45 AM  
Office Visit with Ros Zayas MD  
[facility] ([address]) Appt Note: 6 month [address] [facility] [address]  
[phone] Upcoming Health Maintenance Date Due DTaP/Tdap/Td series (1 - Tdap) 9/3/1963 Shingrix Vaccine Age 50> (1 of 2) 9/3/1992 GLAUCOMA SCREENING Q2Y 9/3/2007 Pneumococcal 65+ Low/Medium Risk (1 of 2 - PCV13) 9/3/2007 MEDICARE YEARLY EXAM 3/14/2018 Influenza Age 5 to Adult 8/1/2018 Allergies as of 9/25/2018  Review Complete On: 9/25/2018 By: Nisa Torres Severity Noted Reaction Type Reactions Lisinopril    Not Reported This Time Morphine    Other (comments) GI distress Niacin    Rash, Itching Zetia [Ezetimibe]    Other (comments) Acid reflux Zocor [Simvastatin]    Not Reported This Time Current Immunizations  Never Reviewed No immunizations on file. Not reviewed this visit You Were Diagnosed With   
  
 Codes Comments Coronary artery disease involving native coronary artery of native heart without angina pectoris    -  Primary ICD-10-CM: I25.10 ICD-9-CM: 414.01 Clinically stable. Continue treatment Benign hypertensive heart disease without heart failure     ICD-10-CM: I11.9 ICD-9-CM: 402.10 Stable continue medical management Hyperlipidemia, unspecified hyperlipidemia type     ICD-10-CM: E78.5 ICD-9-CM: 272.4 Tolerating Crestor. Lab with PCP Postsurgical percutaneous transluminal coronary angioplasty status     ICD-10-CM: Z98.61 ICD-9-CM: V45.82 Stable Postsurgical aortocoronary bypass status     ICD-10-CM: Z95.1 ICD-9-CM: V45.81 Stable Vitals BP Pulse Height(growth percentile) Weight(growth percentile) BMI Smoking Status 116/54 (!) 58 5' 6\" (1.676 m) 186 lb (84.4 kg) 30.02 kg/m2 Former Smoker Vitals History BMI and BSA Data Body Mass Index Body Surface Area 30.02 kg/m 2 1.98 m 2 Preferred Pharmacy Pharmacy Name Phone [facility], [place] [address] [facility] Your Updated Medication List  
  
   
This list is accurate as of 9/25/18  8:23 AM.  Always use your most recent med list.  
  
  
  
  
 aspirin 81 mg tablet Take 81 mg by mouth daily. BYSTOLIC 10 mg tablet Generic drug:  nebivolol Take 5 mg by mouth daily. cephALEXin 500 mg capsule Commonly known as:  Jessica Rota Take 500 mg by mouth two (2) times a day. CRESTOR 20 mg tablet Generic drug:  rosuvastatin Take 20 mg by mouth nightly.  
  
 gabapentin 300 mg tablet Commonly known as:  NEURONTIN Take  by mouth three (3) times daily. losartan 100 mg tablet Commonly known as:  COZAAR Take 100 mg by mouth daily. omega-3 fatty acids-vitamin e 1,000 mg Cap  
1,000 mg. PLAVIX 75 mg Tab Generic drug:  clopidogrel Take  by mouth daily. traMADol 50 mg tablet Commonly known as:  ULTRAM  
Take 1 Tab by mouth two (2) times daily as needed for Pain (1 tab po bid prn pain greater than 5/10.). Max Daily Amount: 100 mg. VITAMIN D3 2,000 unit Tab Generic drug:  cholecalciferol (vitamin D3) Take  by mouth daily. Follow-up Instructions Return in about 6 months (around 3/25/2019). Introducing [facility]! Dear Jimmy Smith: Thank you for requesting a [employer] account. Our records indicate that you already have an active [employer] account. You can access your account anytime at [email or web address] Did you know that you can access your hospital and ER discharge instructions at any time in [employer]? You can also review all of your test results from your hospital stay or ER visit. Additional Information If you have questions, please visit the Frequently Asked Questions section of the [employer] website at [email or web address] Remember, [employer] is NOT to be used for urgent needs. For medical emergencies, dial 911. Now available from your iPhone and Android! Please provide this summary of care documentation to your next provider. Your primary care clinician is listed as Aleksandr Mchugh. If you have any questions after today's visit, please call [phone].

## 2018-09-25 NOTE — LETTER
Noni Camacho 1940 
 
9/25/2018 Dear DO Lakeisha Baez MD Claria Rue, MD 
 
I had the pleasure of evaluating  Mr. Moiz Aguilera in office today. Below are the relevant portions of my assessment and plan of care. ICD-10-CM ICD-9-CM 1. Coronary artery disease involving native coronary artery of native heart without angina pectoris I25.10 414.01 Clinically stable. Continue treatment 2. Benign hypertensive heart disease without heart failure I11.9 402.10 Stable continue medical management 3. Hyperlipidemia, unspecified hyperlipidemia type E78.5 272.4 Tolerating Crestor. Lab with PCP 4. Postsurgical percutaneous transluminal coronary angioplasty status Z98.61 V45.82 Stable 5. Postsurgical aortocoronary bypass status Z95.1 V45.81 Stable Current Outpatient Prescriptions Medication Sig Dispense Refill  losartan (COZAAR) 100 mg tablet Take 100 mg by mouth daily.  cephALEXin (KEFLEX) 500 mg capsule Take 500 mg by mouth two (2) times a day.  traMADol (ULTRAM) 50 mg tablet Take 1 Tab by mouth two (2) times daily as needed for Pain (1 tab po bid prn pain greater than 5/10.). Max Daily Amount: 100 mg. 30 Tab 0  
 omega-3 fatty acids-vitamin e 1,000 mg cap 1,000 mg.  cholecalciferol, vitamin D3, (VITAMIN D3) 2,000 unit tab Take  by mouth daily.  aspirin 81 mg tablet Take 81 mg by mouth daily.  nebivolol (BYSTOLIC) 10 mg tablet Take 5 mg by mouth daily.  rosuvastatin (CRESTOR) 20 mg tablet Take 20 mg by mouth nightly.  gabapentin (NEURONTIN) 300 mg tablet Take  by mouth three (3) times daily.  clopidogrel (PLAVIX) 75 mg tablet Take  by mouth daily. No orders of the defined types were placed in this encounter. If you have questions, please do not hesitate to call me. I look forward to following Mr. Moiz Aguilera along with you. Sincerely, Jasper Warner MD

## 2018-09-25 NOTE — PROGRESS NOTES
HISTORY OF PRESENT ILLNESS  eKiry Taylor is a 70 y.o. male. HPI Comments: Patient with cad,post cabg and pci,htn. On follow up patient denies any chest pains,sob, palpitation or other significant symptoms. Hypertension   The history is provided by the patient. This is a chronic problem. The problem occurs constantly. The problem has not changed since onset. Pertinent negatives include no chest pain and no shortness of breath. Nothing aggravates the symptoms. Cholesterol Problem   The history is provided by the patient. This is a chronic problem. The problem occurs constantly. The problem has not changed since onset. Pertinent negatives include no chest pain and no shortness of breath. Review of Systems   Constitutional: Negative for chills, fever and malaise/fatigue. HENT: Negative for nosebleeds. Eyes: Negative for blurred vision and double vision. Respiratory: Negative for cough, hemoptysis, sputum production, shortness of breath and wheezing. Cardiovascular: Negative for chest pain, palpitations, orthopnea, claudication, leg swelling and PND. Gastrointestinal: Negative for heartburn, nausea and vomiting. Musculoskeletal: Negative for myalgias. Skin: Negative for rash. Neurological: Negative for dizziness and weakness. Endo/Heme/Allergies: Does not bruise/bleed easily.      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 failure     bp stable    CAD (coronary artery disease)     4 way bypass    Cough     Decreased hearing     right ear    Depression     Diabetes [facility] [address]     Fatigue 9/5/2013    severe fatigue and sob for past few weeks r/o ichemia,cmp,anemia     HLD (hyperlipidemia)     HTN (hypertension)     Hypercholesterolemia     Hyperparathyroidism ([address]     Insomnia     Joint pain     Kidney stones     Low back pain     Lumbago     Lumbar spinal stenosis     Mild acid reflux     Mitral valve disorder     Obesity, unspecified     Patine has increased weight    Poliomyelitis     as a child with resulting right hand deformity    Postsurgical aortocoronary bypass status     Postsurgical percutaneous transluminal coronary angioplasty status     Stent of vein graft to RCA with YOSVANY/4/2008    Psychosexual dysfunction     Rosacea     stroke     x2  Jan 2006    Tricuspid valve disease     Vitamin D deficiency        Past Surgical History:   Procedure Laterality Date    HX APPENDECTOMY      HX CORONARY ARTERY BYPASS GRAFT      Four-vessel; and subsequent stents    HX CORONARY STENT PLACEMENT      Stent:VG to RCA and RT PDA beyond that/4/2008    HX HERNIA REPAIR      x3 left and right groin and epigastric area       Allergies   Allergen Reactions    Lisinopril Not Reported This Time    Morphine Other (comments)     GI distress    Niacin Rash and Itching    Zetia [Ezetimibe] Other (comments)     Acid reflux    Zocor [Simvastatin] Not Reported This Time       Current Outpatient Prescriptions   Medication Sig    losartan (COZAAR) 100 mg tablet Take 100 mg by mouth daily.  cephALEXin (KEFLEX) 500 mg capsule Take 500 mg by mouth two (2) times a day.  traMADol (ULTRAM) 50 mg tablet Take 1 Tab by mouth two (2) times daily as needed for Pain (1 tab po bid prn pain greater than 5/10.). Max Daily Amount: 100 mg.    omega-3 fatty acids-vitamin e 1,000 mg cap 1,000 mg.  cholecalciferol, vitamin D3, (VITAMIN D3) 2,000 unit tab Take  by mouth daily.  aspirin 81 mg tablet Take 81 mg by mouth daily.  nebivolol (BYSTOLIC) 10 mg tablet Take 5 mg by mouth daily.  rosuvastatin (CRESTOR) 20 mg tablet Take 20 mg by mouth nightly.  gabapentin (NEURONTIN) 300 mg tablet Take  by mouth three (3) times daily.       clopidogrel (PLAVIX) 75 mg tablet Take  by mouth daily. No current facility-administered medications for this visit. Visit Vitals    /54    Pulse (!) 58    Ht 5' 6\" (1.676 m)    Wt 84.4 kg (186 lb)    BMI 30.02 kg/m2         Physical Exam   Constitutional: He is oriented to person, place, and time. He appears well-developed and well-nourished. obese   HENT:   Head: Normocephalic and atraumatic. Eyes: Conjunctivae are normal.   Neck: Neck supple. No JVD present. No tracheal deviation present. No thyromegaly present. Cardiovascular: Normal rate, regular rhythm and normal heart sounds. Exam reveals no gallop and no friction rub. No murmur heard. Pulmonary/Chest: Breath sounds normal. No respiratory distress. He has no wheezes. He has no rales. He exhibits no tenderness. Abdominal: Soft. There is no tenderness. Musculoskeletal: He exhibits no edema. Neurological: He is alert and oriented to person, place, and time. Skin: Skin is warm and dry. Psychiatric: He has a normal mood and affect.      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 failure     bp stable    CAD (coronary artery disease)     4 way bypass    Cough     Decreased hearing     right ear    Depression     Diabetes ([address]     Fatigue 9/5/2013    severe fatigue and sob for past few weeks r/o ichemia,cmp,anemia     HLD (hyperlipidemia)     HTN (hypertension)     Hypercholesterolemia     Hyperparathyroidism ([address]     Insomnia     Joint pain     Kidney stones     Low back pain     Lumbago     Lumbar spinal stenosis     Mild acid reflux     Mitral valve disorder     Obesity, unspecified     Patine has increased weight    Poliomyelitis     as a child with resulting right hand deformity    Postsurgical aortocoronary bypass status     Postsurgical percutaneous transluminal coronary angioplasty status     Stent of vein graft to RCA with YOSVANY/4/2008    Psychosexual dysfunction     Rosacea     stroke     x2  Jan 2006    Tricuspid valve disease     Vitamin D deficiency        Past Surgical History:   Procedure Laterality Date    HX APPENDECTOMY      HX CORONARY ARTERY BYPASS GRAFT      Four-vessel; and subsequent stents    HX CORONARY STENT PLACEMENT      Stent:VG to RCA and RT PDA beyond that/4/2008    HX HERNIA REPAIR      x3 left and right groin and epigastric area       Social History   Substance Use Topics    Smoking status: Former Smoker     Packs/day: 1.00     Years: 24.00     Quit date: 1/1/1985    Smokeless tobacco: Never Used    Alcohol use 6.0 oz/week     12 Cans of beer per week       Allergies   Allergen Reactions    Lisinopril Not Reported This Time    Morphine Other (comments)     GI distress    Niacin Rash and Itching    Zetia [Ezetimibe] Other (comments)     Acid reflux    Zocor [Simvastatin] Not Reported This Time       Current Outpatient Prescriptions   Medication Sig    losartan (COZAAR) 100 mg tablet Take 100 mg by mouth daily.  cephALEXin (KEFLEX) 500 mg capsule Take 500 mg by mouth two (2) times a day.  traMADol (ULTRAM) 50 mg tablet Take 1 Tab by mouth two (2) times daily as needed for Pain (1 tab po bid prn pain greater than 5/10.). Max Daily Amount: 100 mg.    omega-3 fatty acids-vitamin e 1,000 mg cap 1,000 mg.  cholecalciferol, vitamin D3, (VITAMIN D3) 2,000 unit tab Take  by mouth daily.  aspirin 81 mg tablet Take 81 mg by mouth daily.  nebivolol (BYSTOLIC) 10 mg tablet Take 5 mg by mouth daily.  rosuvastatin (CRESTOR) 20 mg tablet Take 20 mg by mouth nightly.  gabapentin (NEURONTIN) 300 mg tablet Take  by mouth three (3) times daily.  clopidogrel (PLAVIX) 75 mg tablet Take  by mouth daily. No current facility-administered medications for this visit.         Mr. Wojciech Hernandez has a reminder for a \"due or due soon\" health maintenance. I have asked that he contact his primary care provider for follow-up on this health maintenance. CARDIOLOGY STUDIES 9/9/2013   Myocardial Perfusion Scan Result prob normal,inf fixed with normal wall motion and ef   Echocardiogram - Complete Result normal ef,dd,trace to mild mr,tr   Some recent data might be hidden         Assessment       ICD-10-CM ICD-9-CM    1. Coronary artery disease involving native coronary artery of native heart without angina pectoris I25.10 414.01     Clinically stable. Continue treatment   2. Benign hypertensive heart disease without heart failure I11.9 402.10     Stable continue medical management   3. Hyperlipidemia, unspecified hyperlipidemia type E78.5 272.4     Tolerating Crestor. Lab with PCP   4. Postsurgical percutaneous transluminal coronary angioplasty status Z98.61 V45.82     Stable   5. Postsurgical aortocoronary bypass status Z95.1 V45.81     Stable         No orders of the defined types were placed in this encounter. Follow-up Disposition:  Return in about 6 months (around 3/25/2019).

## 2019-03-26 ENCOUNTER — OFFICE VISIT (OUTPATIENT)
Dept: CARDIOLOGY CLINIC | Age: 79
End: 2019-03-26

## 2019-03-26 VITALS
BODY MASS INDEX: 29.73 KG/M2 | SYSTOLIC BLOOD PRESSURE: 148 MMHG | DIASTOLIC BLOOD PRESSURE: 64 MMHG | HEART RATE: 52 BPM | WEIGHT: 185 LBS | HEIGHT: 66 IN

## 2019-03-26 DIAGNOSIS — I25.10 CORONARY ARTERY DISEASE INVOLVING NATIVE CORONARY ARTERY OF NATIVE HEART WITHOUT ANGINA PECTORIS: Primary | ICD-10-CM

## 2019-03-26 DIAGNOSIS — I11.9 BENIGN HYPERTENSIVE HEART DISEASE WITHOUT HEART FAILURE: ICD-10-CM

## 2019-03-26 DIAGNOSIS — E78.5 HYPERLIPIDEMIA, UNSPECIFIED HYPERLIPIDEMIA TYPE: ICD-10-CM

## 2019-03-26 DIAGNOSIS — Z95.1 POSTSURGICAL AORTOCORONARY BYPASS STATUS: ICD-10-CM

## 2019-03-26 DIAGNOSIS — Z98.61 POSTSURGICAL PERCUTANEOUS TRANSLUMINAL CORONARY ANGIOPLASTY STATUS: ICD-10-CM

## 2019-03-26 NOTE — PROGRESS NOTES
1. Have you been to the ER, urgent care clinic since your last visit? Hospitalized since your last visit? 
 
 no 
 
2. Have you seen or consulted any other health care providers outside of the [address] [place] [address] since your last visit? Include any pap smears or colon screening. Yes Where: pcp 3. Since your last visit, have you had any of the following symptoms?  no

## 2019-03-26 NOTE — PROGRESS NOTES
HISTORY OF PRESENT ILLNESS Staci Gonzalez is a 70 y.o. male. Patient with cad,post cabg and pci,htn. On follow up patient denies any chest pains,sob, palpitation or other significant symptoms. Hypertension The history is provided by the patient. This is a chronic problem. The problem occurs constantly. The problem has not changed since onset. Pertinent negatives include no chest pain and no shortness of breath. Nothing aggravates the symptoms. Cholesterol Problem The history is provided by the patient. This is a chronic problem. The problem occurs constantly. The problem has not changed since onset. Pertinent negatives include no chest pain and no shortness of breath. Review of Systems Constitutional: Negative for chills, fever and malaise/fatigue. HENT: Negative for nosebleeds. Eyes: Negative for blurred vision and double vision. Respiratory: Negative for cough, hemoptysis, sputum production, shortness of breath and wheezing. Cardiovascular: Negative for chest pain, palpitations, orthopnea, claudication, leg swelling and PND. Gastrointestinal: Negative for heartburn, nausea and vomiting. Musculoskeletal: Negative for myalgias. Skin: Negative for rash. Neurological: Negative for dizziness and weakness. Endo/Heme/Allergies: Does not bruise/bleed easily. Family History Problem Relation Age of Onset  Heart Disease Father  Stroke Father  Heart Disease Brother Past Medical History:  
Diagnosis Date  Actinic keratosis  Arthritis  Benign hypertensive heart disease without heart failure   
 bp stable  CAD (coronary artery disease) 4 way bypass  Cough  Decreased hearing   
 right ear  Depression  Diabetes ([facility] [address]  Fatigue 9/5/2013  
 severe fatigue and sob for past few weeks r/o ichemia,cmp,anemia  HLD (hyperlipidemia)  HTN (hypertension)  Hypercholesterolemia  Hyperparathyroidism ([address]  Insomnia  Joint pain  Kidney stones  Low back pain  Lumbago  Lumbar spinal stenosis  Mild acid reflux  Mitral valve disorder  Obesity, unspecified Patine has increased weight  Poliomyelitis   
 as a child with resulting right hand deformity  Postsurgical aortocoronary bypass status  Postsurgical percutaneous transluminal coronary angioplasty status Stent of vein graft to RCA with YOSVANY/4/2008  Psychosexual dysfunction  Rosacea  stroke x2  Jan 2006  Tricuspid valve disease  Vitamin D deficiency Past Surgical History:  
Procedure Laterality Date  HX APPENDECTOMY  HX CORONARY ARTERY BYPASS GRAFT Four-vessel; and subsequent stents [facility] Stent:VG to RCA and RT PDA beyond that/4/2008  HX HERNIA REPAIR    
 x3 left and right groin and epigastric area Allergies Allergen Reactions  Lisinopril Not Reported This Time  Morphine Other (comments) GI distress  Niacin Rash and Itching  Zetia [Ezetimibe] Other (comments) Acid reflux  Zocor [Simvastatin] Not Reported This Time Current Outpatient Medications Medication Sig  
 losartan (COZAAR) 100 mg tablet Take 100 mg by mouth daily.  cephALEXin (KEFLEX) 500 mg capsule Take 500 mg by mouth two (2) times a day.  traMADol (ULTRAM) 50 mg tablet Take 1 Tab by mouth two (2) times daily as needed for Pain (1 tab po bid prn pain greater than 5/10.). Max Daily Amount: 100 mg.  
 omega-3 fatty acids-vitamin e 1,000 mg cap 1,000 mg.  cholecalciferol, vitamin D3, (VITAMIN D3) 2,000 unit tab Take  by mouth daily.  aspirin 81 mg tablet Take 81 mg by mouth daily.  nebivolol (BYSTOLIC) 10 mg tablet Take 5 mg by mouth daily.  rosuvastatin (CRESTOR) 20 mg tablet Take 20 mg by mouth nightly.  gabapentin (NEURONTIN) 300 mg tablet Take  by mouth three (3) times daily.  clopidogrel (PLAVIX) 75 mg tablet Take  by mouth daily. No current facility-administered medications for this visit. Visit Vitals /64 Pulse (!) 52 Ht 5' 6\" (1.676 m) Wt 83.9 kg (185 lb) BMI 29.86 kg/m² Physical Exam  
Constitutional: He is oriented to person, place, and time. He appears well-developed and well-nourished. obese HENT:  
Head: Normocephalic and atraumatic. Eyes: Conjunctivae are normal.  
Neck: Neck supple. No JVD present. No tracheal deviation present. No thyromegaly present. Cardiovascular: Normal rate, regular rhythm and normal heart sounds. Exam reveals no gallop and no friction rub. No murmur heard. Pulmonary/Chest: Breath sounds normal. No respiratory distress. He has no wheezes. He has no rales. He exhibits no tenderness. Abdominal: Soft. There is no tenderness. Musculoskeletal: He exhibits no edema. Neurological: He is alert and oriented to person, place, and time. Skin: Skin is warm and dry. Psychiatric: He has a normal mood and affect. Family History Problem Relation Age of Onset  Heart Disease Father  Stroke Father  Heart Disease Brother Past Medical History:  
Diagnosis Date  Actinic keratosis  Arthritis  Benign hypertensive heart disease without heart failure   
 bp stable  CAD (coronary artery disease) 4 way bypass  Cough  Decreased hearing   
 right ear  Depression  Diabetes ([address]  Fatigue 9/5/2013  
 severe fatigue and sob for past few weeks r/o ichemia,cmp,anemia  HLD (hyperlipidemia)  HTN (hypertension)  Hypercholesterolemia  Hyperparathyroidism ([address]  Insomnia  Joint pain  Kidney stones  Low back pain  Lumbago  Lumbar spinal stenosis  Mild acid reflux  Mitral valve disorder  Obesity, unspecified Patine has increased weight  Poliomyelitis   
 as a child with resulting right hand deformity  Postsurgical aortocoronary bypass status  Postsurgical percutaneous transluminal coronary angioplasty status Stent of vein graft to RCA with YOSVANY  Psychosexual dysfunction  Rosacea  stroke x2  2006  Tricuspid valve disease  Vitamin D deficiency Past Surgical History:  
Procedure Laterality Date  HX APPENDECTOMY  HX CORONARY ARTERY BYPASS GRAFT Four-vessel; and subsequent stents  [facility] Stent:VG to RCA and RT PDA beyond that  HX HERNIA REPAIR    
 x3 left and right groin and epigastric area Social History Tobacco Use  Smoking status: Former Smoker Packs/day: 1.00 Years: 24.00 Pack years: 24.00 Last attempt to quit: 1985 Years since quittin.2  Smokeless tobacco: Never Used Substance Use Topics  Alcohol use: Yes Alcohol/week: 6.0 oz Types: 12 Cans of beer per week Allergies Allergen Reactions  Lisinopril Not Reported This Time  Morphine Other (comments) GI distress  Niacin Rash and Itching  Zetia [Ezetimibe] Other (comments) Acid reflux  Zocor [Simvastatin] Not Reported This Time Current Outpatient Medications Medication Sig  
 losartan (COZAAR) 100 mg tablet Take 100 mg by mouth daily.  cephALEXin (KEFLEX) 500 mg capsule Take 500 mg by mouth two (2) times a day.  traMADol (ULTRAM) 50 mg tablet Take 1 Tab by mouth two (2) times daily as needed for Pain (1 tab po bid prn pain greater than 5/10.). Max Daily Amount: 100 mg.  
 omega-3 fatty acids-vitamin e 1,000 mg cap 1,000 mg.  cholecalciferol, vitamin D3, (VITAMIN D3) 2,000 unit tab Take  by mouth daily.  aspirin 81 mg tablet Take 81 mg by mouth daily.  nebivolol (BYSTOLIC) 10 mg tablet Take 5 mg by mouth daily.  rosuvastatin (CRESTOR) 20 mg tablet Take 20 mg by mouth nightly.     
 gabapentin (NEURONTIN) 300 mg tablet Take  by mouth three (3) times daily.    
 clopidogrel (PLAVIX) 75 mg tablet Take  by mouth daily. No current facility-administered medications for this visit. Mr. James Castellano has a reminder for a \"due or due soon\" health maintenance. I have asked that he contact his primary care provider for follow-up on this health maintenance. No flowsheet data found. Assessment ICD-10-CM ICD-9-CM 1. Coronary artery disease involving native coronary artery of native heart without angina pectoris I25.10 414.01 NUCLEAR CARDIAC STRESS TEST  
   ECHO ADULT COMPLETE Clinically stable continue current medication 2. Benign hypertensive heart disease without heart failure I11.9 402.10 NUCLEAR CARDIAC STRESS TEST  
   ECHO ADULT COMPLETE Mildly elevated in office. Normal at home continue to monitor 3. Hyperlipidemia, unspecified hyperlipidemia type E78.5 272.4 Continue statin lab with PCP 4. Postsurgical percutaneous transluminal coronary angioplasty status Z98.61 V45.82 Stable 5. Postsurgical aortocoronary bypass status Z95.1 V45.81 Stable No orders of the defined types were placed in this encounter. Follow-up and Dispositions · Return for F/u after tests.

## 2019-05-07 ENCOUNTER — OFFICE VISIT (OUTPATIENT)
Dept: CARDIOLOGY CLINIC | Age: 79
End: 2019-05-07

## 2019-05-07 VITALS
DIASTOLIC BLOOD PRESSURE: 60 MMHG | SYSTOLIC BLOOD PRESSURE: 132 MMHG | HEIGHT: 66 IN | WEIGHT: 183 LBS | BODY MASS INDEX: 29.41 KG/M2 | HEART RATE: 59 BPM

## 2019-05-07 DIAGNOSIS — I11.9 BENIGN HYPERTENSIVE HEART DISEASE WITHOUT HEART FAILURE: ICD-10-CM

## 2019-05-07 DIAGNOSIS — I25.10 CORONARY ARTERY DISEASE INVOLVING NATIVE CORONARY ARTERY OF NATIVE HEART WITHOUT ANGINA PECTORIS: Primary | ICD-10-CM

## 2019-05-07 DIAGNOSIS — I51.7 ENLARGED RV (RIGHT VENTRICLE): ICD-10-CM

## 2019-05-07 DIAGNOSIS — Z98.61 POSTSURGICAL PERCUTANEOUS TRANSLUMINAL CORONARY ANGIOPLASTY STATUS: ICD-10-CM

## 2019-05-07 DIAGNOSIS — E78.5 HYPERLIPIDEMIA, UNSPECIFIED HYPERLIPIDEMIA TYPE: ICD-10-CM

## 2019-05-07 NOTE — PROGRESS NOTES
HISTORY OF PRESENT ILLNESS  Paula Bo is a 70 y.o. male. Patient with cad,post cabg and pci,htn. On follow up patient denies any chest pains,sob, palpitation or other significant symptoms. Patient is here for follow up of diagnostic tests. Results will be discussed. Hypertension   The history is provided by the patient. This is a chronic problem. The problem occurs constantly. The problem has not changed since onset. Pertinent negatives include no chest pain and no shortness of breath. Nothing aggravates the symptoms. Cholesterol Problem   The history is provided by the patient. This is a chronic problem. The problem occurs constantly. The problem has not changed since onset. Pertinent negatives include no chest pain and no shortness of breath. Review of Systems   Constitutional: Negative for chills, fever and malaise/fatigue. HENT: Negative for nosebleeds. Eyes: Negative for blurred vision and double vision. Respiratory: Negative for cough, hemoptysis, sputum production, shortness of breath and wheezing. Cardiovascular: Negative for chest pain, palpitations, orthopnea, claudication, leg swelling and PND. Gastrointestinal: Negative for heartburn, nausea and vomiting. Musculoskeletal: Negative for myalgias. Skin: Negative for rash. Neurological: Negative for dizziness and weakness. Endo/Heme/Allergies: Does not bruise/bleed easily.      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 failure     bp stable    CAD (coronary artery disease)     4 way bypass    Cough     Decreased hearing     right ear    Depression     Diabetes ([facility] [address]     Fatigue 9/5/2013    severe fatigue and sob for past few weeks r/o ichemia,cmp,anemia     HLD (hyperlipidemia)     HTN (hypertension)     Hypercholesterolemia     Hyperparathyroidism ([facility] [address]     Insomnia     Joint pain     Kidney stones     Low back pain     Lumbago     Lumbar spinal stenosis     Mild acid reflux     Mitral valve disorder     Obesity, unspecified     Patine has increased weight    Poliomyelitis     as a child with resulting right hand deformity    Postsurgical aortocoronary bypass status     Postsurgical percutaneous transluminal coronary angioplasty status     Stent of vein graft to RCA with YOSVANY/4/2008    Psychosexual dysfunction     Rosacea     stroke     x2  Jan 2006    Tricuspid valve disease     Vitamin D deficiency        Past Surgical History:   Procedure Laterality Date    HX APPENDECTOMY      HX CORONARY ARTERY BYPASS GRAFT      Four-vessel; and subsequent stents    HX CORONARY STENT PLACEMENT      Stent:VG to RCA and RT PDA beyond that/4/2008    HX HERNIA REPAIR      x3 left and right groin and epigastric area       Allergies   Allergen Reactions    Lisinopril Not Reported This Time    Morphine Other (comments)     GI distress    Niacin Rash and Itching    Zetia [Ezetimibe] Other (comments)     Acid reflux    Zocor [Simvastatin] Not Reported This Time       Current Outpatient Medications   Medication Sig    losartan (COZAAR) 100 mg tablet Take 100 mg by mouth daily.  cephALEXin (KEFLEX) 500 mg capsule Take 500 mg by mouth two (2) times a day.  traMADol (ULTRAM) 50 mg tablet Take 1 Tab by mouth two (2) times daily as needed for Pain (1 tab po bid prn pain greater than 5/10.). Max Daily Amount: 100 mg.    omega-3 fatty acids-vitamin e 1,000 mg cap 1,000 mg.  cholecalciferol, vitamin D3, (VITAMIN D3) 2,000 unit tab Take  by mouth daily.  aspirin 81 mg tablet Take 81 mg by mouth daily.  nebivolol (BYSTOLIC) 10 mg tablet Take 5 mg by mouth daily.  rosuvastatin (CRESTOR) 20 mg tablet Take 20 mg by mouth nightly.       gabapentin (NEURONTIN) 300 mg tablet Take  by mouth three (3) times daily.      clopidogrel (PLAVIX) 75 mg tablet Take  by mouth daily. No current facility-administered medications for this visit. Visit Vitals  /60   Pulse (!) 59   Ht 5' 6\" (1.676 m)   Wt 83 kg (183 lb)   BMI 29.54 kg/m²         Physical Exam   Constitutional: He is oriented to person, place, and time. He appears well-developed and well-nourished. obese   HENT:   Head: Normocephalic and atraumatic. Eyes: Conjunctivae are normal.   Neck: Neck supple. No JVD present. No tracheal deviation present. No thyromegaly present. Cardiovascular: Normal rate, regular rhythm and normal heart sounds. Exam reveals no gallop and no friction rub. No murmur heard. Pulmonary/Chest: Breath sounds normal. No respiratory distress. He has no wheezes. He has no rales. He exhibits no tenderness. Abdominal: Soft. There is no tenderness. Musculoskeletal: He exhibits no edema. Neurological: He is alert and oriented to person, place, and time. Skin: Skin is warm and dry. Psychiatric: He has a normal mood and affect.      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 failure     bp stable    CAD (coronary artery disease)     4 way bypass    Cough     Decreased hearing     right ear    Depression     Diabetes ([address]     Fatigue 9/5/2013    severe fatigue and sob for past few weeks r/o ichemia,cmp,anemia     HLD (hyperlipidemia)     HTN (hypertension)     Hypercholesterolemia     Hyperparathyroidism ([address]     Insomnia     Joint pain     Kidney stones     Low back pain     Lumbago     Lumbar spinal stenosis     Mild acid reflux     Mitral valve disorder     Obesity, unspecified     Patine has increased weight    Poliomyelitis     as a child with resulting right hand deformity    Postsurgical aortocoronary bypass status     Postsurgical percutaneous transluminal coronary angioplasty status     Stent of vein graft to RCA with YOSVANY    Psychosexual dysfunction     Rosacea     stroke     x2  2006    Tricuspid valve disease     Vitamin D deficiency        Past Surgical History:   Procedure Laterality Date    HX APPENDECTOMY      HX CORONARY ARTERY BYPASS GRAFT      Four-vessel; and subsequent stents    HX CORONARY STENT PLACEMENT      Stent:VG to RCA and RT PDA beyond that    HX HERNIA REPAIR      x3 left and right groin and epigastric area       Social History     Tobacco Use    Smoking status: Former Smoker     Packs/day: 1.00     Years: 24.00     Pack years: 24.00     Last attempt to quit: 1985     Years since quittin.3    Smokeless tobacco: Never Used   Substance Use Topics    Alcohol use: Yes     Alcohol/week: 6.0 oz     Types: 12 Cans of beer per week       Allergies   Allergen Reactions    Lisinopril Not Reported This Time    Morphine Other (comments)     GI distress    Niacin Rash and Itching    Zetia [Ezetimibe] Other (comments)     Acid reflux    Zocor [Simvastatin] Not Reported This Time       Current Outpatient Medications   Medication Sig    losartan (COZAAR) 100 mg tablet Take 100 mg by mouth daily.  cephALEXin (KEFLEX) 500 mg capsule Take 500 mg by mouth two (2) times a day.  traMADol (ULTRAM) 50 mg tablet Take 1 Tab by mouth two (2) times daily as needed for Pain (1 tab po bid prn pain greater than 5/10.). Max Daily Amount: 100 mg.    omega-3 fatty acids-vitamin e 1,000 mg cap 1,000 mg.  cholecalciferol, vitamin D3, (VITAMIN D3) 2,000 unit tab Take  by mouth daily.  aspirin 81 mg tablet Take 81 mg by mouth daily.  nebivolol (BYSTOLIC) 10 mg tablet Take 5 mg by mouth daily.  rosuvastatin (CRESTOR) 20 mg tablet Take 20 mg by mouth nightly.  gabapentin (NEURONTIN) 300 mg tablet Take  by mouth three (3) times daily.  clopidogrel (PLAVIX) 75 mg tablet Take  by mouth daily. No current facility-administered medications for this visit. Mr. Marci Padgett has a reminder for a \"due or due soon\" health maintenance. I have asked that he contact his primary care provider for follow-up on this health maintenance. Interpretation Summary   4/ 2019     · Estimated left ventricular ejection fraction is 56 - 60%. Left ventricular mild concentric hypertrophy. Abnormal left ventricular wall motion as described on the wall scoring diagram below. Moderate (grade 2) left ventricular diastolic dysfunction. · Right ventricular cavity size is moderately dilated. · Right atrial cavity size is moderately dilated. · Mild aortic valve sclerosis. · Mitral valve thickening. Mild mitral valve regurgitation. · Mild tricuspid valve regurgitation is present. There is no evidence of pulmonary hypertension. · Mild pulmonic valve regurgitation is present. Procedure Conclusion 4/2019    Nuclear Stress Test     Negative myocardial perfusion imaging. Myocardial perfusion imaging supports a low risk stress test.   There is a prior study available for comparison. Assessment       ICD-10-CM ICD-9-CM    1. Coronary artery disease involving native coronary artery of native heart without angina pectoris I25.10 414.01     Stable negative stress test continue to monitor   2. Benign hypertensive heart disease without heart failure I11.9 402.10     Stable continue treatment   3. Hyperlipidemia, unspecified hyperlipidemia type E78.5 272.4     Continue statin lab with PCP   4. Postsurgical percutaneous transluminal coronary angioplasty status Z98.61 V45.82    5. Enlarged RV (right ventricle) I51.7 429.3     Asymptomatic no pulmonary hypertension detected. Monitor clinically         No orders of the defined types were placed in this encounter. Follow-up and Dispositions    · Return in about 6 months (around 11/7/2019).

## 2019-05-07 NOTE — PROGRESS NOTES
1. Have you been to the ER, urgent care clinic since your last visit? Hospitalized since your last visit?     no    2. Have you seen or consulted any other health care providers outside of the [address] [place] [address] since your last visit? Include any pap smears or colon screening. No     3. Since your last visit, have you had any of the following symptoms?  no

## 2019-11-04 ENCOUNTER — OFFICE VISIT (OUTPATIENT)
Dept: CARDIOLOGY CLINIC | Age: 79
End: 2019-11-04

## 2019-11-04 VITALS
SYSTOLIC BLOOD PRESSURE: 98 MMHG | DIASTOLIC BLOOD PRESSURE: 52 MMHG | WEIGHT: 179 LBS | HEART RATE: 59 BPM | HEIGHT: 66 IN | BODY MASS INDEX: 28.77 KG/M2

## 2019-11-04 DIAGNOSIS — E78.5 HYPERLIPIDEMIA, UNSPECIFIED HYPERLIPIDEMIA TYPE: ICD-10-CM

## 2019-11-04 DIAGNOSIS — Z98.61 POSTSURGICAL PERCUTANEOUS TRANSLUMINAL CORONARY ANGIOPLASTY STATUS: ICD-10-CM

## 2019-11-04 DIAGNOSIS — I25.10 CORONARY ARTERY DISEASE INVOLVING NATIVE CORONARY ARTERY OF NATIVE HEART WITHOUT ANGINA PECTORIS: Primary | ICD-10-CM

## 2019-11-04 DIAGNOSIS — I11.9 BENIGN HYPERTENSIVE HEART DISEASE WITHOUT HEART FAILURE: ICD-10-CM

## 2019-11-04 NOTE — PROGRESS NOTES
HISTORY OF PRESENT ILLNESS  Francois Walker is a 70 y.o. male. Patient with cad,post cabg and pci,htn. On follow up patient denies any chest pains,sob, palpitation or other significant symptoms. Hypertension   The history is provided by the patient. This is a chronic problem. The problem occurs constantly. The problem has not changed since onset. Pertinent negatives include no chest pain and no shortness of breath. Nothing aggravates the symptoms. Cholesterol Problem   The history is provided by the patient. This is a chronic problem. The problem occurs constantly. The problem has not changed since onset. Pertinent negatives include no chest pain and no shortness of breath. Review of Systems   Constitutional: Negative for chills, fever and malaise/fatigue. HENT: Negative for nosebleeds. Eyes: Negative for blurred vision and double vision. Respiratory: Negative for cough, hemoptysis, sputum production, shortness of breath and wheezing. Cardiovascular: Negative for chest pain, palpitations, orthopnea, claudication, leg swelling and PND. Gastrointestinal: Negative for heartburn, nausea and vomiting. Musculoskeletal: Negative for myalgias. Skin: Negative for rash. Neurological: Negative for dizziness and weakness. Endo/Heme/Allergies: Does not bruise/bleed easily.      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 failure     bp stable    CAD (coronary artery disease)     4 way bypass    Cough     Decreased hearing     right ear    Depression     Diabetes ([address]     Fatigue 9/5/2013    severe fatigue and sob for past few weeks r/o ichemia,cmp,anemia     HLD (hyperlipidemia)     HTN (hypertension)     Hypercholesterolemia     Hyperparathyroidism ([address]     Insomnia     Joint pain     Kidney stones     Low back pain     Lumbago     Lumbar spinal stenosis     Mild acid reflux     Mitral valve disorder     Obesity, unspecified     Patine has increased weight    Poliomyelitis     as a child with resulting right hand deformity    Postsurgical aortocoronary bypass status     Postsurgical percutaneous transluminal coronary angioplasty status     Stent of vein graft to RCA with YOSVANY/4/2008    Psychosexual dysfunction     Rosacea     stroke     x2  Jan 2006    Tricuspid valve disease     Vitamin D deficiency        Past Surgical History:   Procedure Laterality Date    HX APPENDECTOMY      HX CORONARY ARTERY BYPASS GRAFT      Four-vessel; and subsequent stents    HX CORONARY STENT PLACEMENT      Stent:VG to RCA and RT PDA beyond that/4/2008    HX HERNIA REPAIR      x3 left and right groin and epigastric area       Allergies   Allergen Reactions    Lisinopril Not Reported This Time    Morphine Other (comments)     GI distress    Niacin Rash and Itching    Zetia [Ezetimibe] Other (comments)     Acid reflux    Zocor [Simvastatin] Not Reported This Time       Current Outpatient Medications   Medication Sig    losartan (COZAAR) 100 mg tablet Take 100 mg by mouth daily.  cephALEXin (KEFLEX) 500 mg capsule Take 500 mg by mouth two (2) times a day.  traMADol (ULTRAM) 50 mg tablet Take 1 Tab by mouth two (2) times daily as needed for Pain (1 tab po bid prn pain greater than 5/10.). Max Daily Amount: 100 mg.    omega-3 fatty acids-vitamin e 1,000 mg cap 1,000 mg.  cholecalciferol, vitamin D3, (VITAMIN D3) 2,000 unit tab Take  by mouth daily.  aspirin 81 mg tablet Take 81 mg by mouth daily.  nebivolol (BYSTOLIC) 10 mg tablet Take 5 mg by mouth daily.  rosuvastatin (CRESTOR) 20 mg tablet Take 20 mg by mouth nightly.  gabapentin (NEURONTIN) 300 mg tablet Take  by mouth three (3) times daily.  clopidogrel (PLAVIX) 75 mg tablet Take  by mouth daily.        No current facility-administered medications for this visit. Visit Vitals  BP 98/52   Pulse (!) 59   Ht 5' 6\" (1.676 m)   Wt 81.2 kg (179 lb)   BMI 28.89 kg/m²         Physical Exam   Constitutional: He is oriented to person, place, and time. He appears well-developed and well-nourished. obese   HENT:   Head: Normocephalic and atraumatic. Eyes: Conjunctivae are normal.   Neck: Neck supple. No JVD present. No tracheal deviation present. No thyromegaly present. Cardiovascular: Normal rate, regular rhythm and normal heart sounds. Exam reveals no gallop and no friction rub. No murmur heard. Pulmonary/Chest: Breath sounds normal. No respiratory distress. He has no wheezes. He has no rales. He exhibits no tenderness. Abdominal: Soft. There is no tenderness. Musculoskeletal: He exhibits no edema. Neurological: He is alert and oriented to person, place, and time. Skin: Skin is warm and dry. Psychiatric: He has a normal mood and affect.      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 failure     bp stable    CAD (coronary artery disease)     4 way bypass    Cough     Decreased hearing     right ear    Depression     Diabetes ([address]     Fatigue 9/5/2013    severe fatigue and sob for past few weeks r/o ichemia,cmp,anemia     HLD (hyperlipidemia)     HTN (hypertension)     Hypercholesterolemia     Hyperparathyroidism ([address]     Insomnia     Joint pain     Kidney stones     Low back pain     Lumbago     Lumbar spinal stenosis     Mild acid reflux     Mitral valve disorder     Obesity, unspecified     Patine has increased weight    Poliomyelitis     as a child with resulting right hand deformity    Postsurgical aortocoronary bypass status     Postsurgical percutaneous transluminal coronary angioplasty status     Stent of vein graft to RCA with YOSVANY    Psychosexual dysfunction     Rosacea     stroke     x2  2006    Tricuspid valve disease     Vitamin D deficiency        Past Surgical History:   Procedure Laterality Date    HX APPENDECTOMY      HX CORONARY ARTERY BYPASS GRAFT      Four-vessel; and subsequent stents    HX CORONARY STENT PLACEMENT      Stent:VG to RCA and RT PDA beyond that/2008    HX HERNIA REPAIR      x3 left and right groin and epigastric area       Social History     Tobacco Use    Smoking status: Former Smoker     Packs/day: 1.00     Years: 24.00     Pack years: 24.00     Last attempt to quit: 1985     Years since quittin.8    Smokeless tobacco: Never Used   Substance Use Topics    Alcohol use: Yes     Alcohol/week: 10.0 standard drinks     Types: 12 Cans of beer per week       Allergies   Allergen Reactions    Lisinopril Not Reported This Time    Morphine Other (comments)     GI distress    Niacin Rash and Itching    Zetia [Ezetimibe] Other (comments)     Acid reflux    Zocor [Simvastatin] Not Reported This Time       Current Outpatient Medications   Medication Sig    losartan (COZAAR) 100 mg tablet Take 100 mg by mouth daily.  cephALEXin (KEFLEX) 500 mg capsule Take 500 mg by mouth two (2) times a day.  traMADol (ULTRAM) 50 mg tablet Take 1 Tab by mouth two (2) times daily as needed for Pain (1 tab po bid prn pain greater than 5/10.). Max Daily Amount: 100 mg.    omega-3 fatty acids-vitamin e 1,000 mg cap 1,000 mg.  cholecalciferol, vitamin D3, (VITAMIN D3) 2,000 unit tab Take  by mouth daily.  aspirin 81 mg tablet Take 81 mg by mouth daily.  nebivolol (BYSTOLIC) 10 mg tablet Take 5 mg by mouth daily.  rosuvastatin (CRESTOR) 20 mg tablet Take 20 mg by mouth nightly.  gabapentin (NEURONTIN) 300 mg tablet Take  by mouth three (3) times daily.  clopidogrel (PLAVIX) 75 mg tablet Take  by mouth daily.        No current facility-administered medications for this visit. Mr. Christen Baker has a reminder for a \"due or due soon\" health maintenance. I have asked that he contact his primary care provider for follow-up on this health maintenance. Interpretation Summary   4/ 2019     · Estimated left ventricular ejection fraction is 56 - 60%. Left ventricular mild concentric hypertrophy. Abnormal left ventricular wall motion as described on the wall scoring diagram below. Moderate (grade 2) left ventricular diastolic dysfunction. · Right ventricular cavity size is moderately dilated. · Right atrial cavity size is moderately dilated. · Mild aortic valve sclerosis. · Mitral valve thickening. Mild mitral valve regurgitation. · Mild tricuspid valve regurgitation is present. There is no evidence of pulmonary hypertension. · Mild pulmonic valve regurgitation is present. Procedure Conclusion 4/2019    Nuclear Stress Test     Negative myocardial perfusion imaging. Myocardial perfusion imaging supports a low risk stress test.   There is a prior study available for comparison. Assessment       ICD-10-CM ICD-9-CM    1. Coronary artery disease involving native coronary artery of native heart without angina pectoris I25.10 414.01     Stable continue current medical management   2. Benign hypertensive heart disease without heart failure I11.9 402.10     Low normal blood pressure in office today but normal reading at home we will continue to monitor. Asymptomatic   3. Hyperlipidemia, unspecified hyperlipidemia type E78.5 272.4     Continue treatment follow-up lab with PCP   4. Postsurgical percutaneous transluminal coronary angioplasty status Z98.61 V45.82     Stable         No orders of the defined types were placed in this encounter. Follow-up and Dispositions    · Return in about 6 months (around 5/4/2020).

## 2020-07-02 ENCOUNTER — OFFICE VISIT (OUTPATIENT)
Dept: CARDIOLOGY CLINIC | Age: 80
End: 2020-07-02

## 2020-07-02 VITALS
SYSTOLIC BLOOD PRESSURE: 148 MMHG | HEIGHT: 66 IN | HEART RATE: 57 BPM | OXYGEN SATURATION: 94 % | DIASTOLIC BLOOD PRESSURE: 52 MMHG | BODY MASS INDEX: 28.93 KG/M2 | WEIGHT: 180 LBS | TEMPERATURE: 97.1 F

## 2020-07-02 DIAGNOSIS — E78.5 HYPERLIPIDEMIA, UNSPECIFIED HYPERLIPIDEMIA TYPE: ICD-10-CM

## 2020-07-02 DIAGNOSIS — I25.10 CORONARY ARTERY DISEASE INVOLVING NATIVE CORONARY ARTERY OF NATIVE HEART WITHOUT ANGINA PECTORIS: Primary | ICD-10-CM

## 2020-07-02 DIAGNOSIS — Z95.1 POSTSURGICAL AORTOCORONARY BYPASS STATUS: ICD-10-CM

## 2020-07-02 DIAGNOSIS — I11.9 BENIGN HYPERTENSIVE HEART DISEASE WITHOUT HEART FAILURE: ICD-10-CM

## 2020-07-02 DIAGNOSIS — Z98.61 POSTSURGICAL PERCUTANEOUS TRANSLUMINAL CORONARY ANGIOPLASTY STATUS: ICD-10-CM

## 2020-07-02 RX ORDER — DILTIAZEM HYDROCHLORIDE 120 MG/1
CAPSULE, EXTENDED RELEASE ORAL DAILY
COMMUNITY

## 2020-07-02 RX ORDER — DILTIAZEM HYDROCHLORIDE 120 MG/1
120 TABLET, FILM COATED ORAL DAILY
COMMUNITY
End: 2020-07-02 | Stop reason: SDUPTHER

## 2020-07-02 NOTE — PROGRESS NOTES
1. Have you been to the ER, urgent care clinic since your last visit? Hospitalized since your last visit?     no    2. Have you seen or consulted any other health care providers outside of the [address] [place] [address] since your last visit? Include any pap smears or colon screening.       yes

## 2020-07-02 NOTE — PROGRESS NOTES
HISTORY OF PRESENT ILLNESS  Amilcar Basurto is a 70 y.o. male. Patient with cad,post cabg and pci,htn. On follow up patient denies any chest pains,sob, palpitation or other significant symptoms. Hypertension   The history is provided by the patient. This is a chronic problem. The problem occurs constantly. The problem has not changed since onset. Pertinent negatives include no chest pain and no shortness of breath. Nothing aggravates the symptoms. Cholesterol Problem   The history is provided by the patient. This is a chronic problem. The problem occurs constantly. The problem has not changed since onset. Pertinent negatives include no chest pain and no shortness of breath. Review of Systems   Constitutional: Negative for chills, fever and malaise/fatigue. HENT: Negative for nosebleeds. Eyes: Negative for blurred vision and double vision. Respiratory: Negative for cough, hemoptysis, sputum production, shortness of breath and wheezing. Cardiovascular: Negative for chest pain, palpitations, orthopnea, claudication, leg swelling and PND. Gastrointestinal: Negative for heartburn, nausea and vomiting. Musculoskeletal: Negative for myalgias. Skin: Negative for rash. Neurological: Negative for dizziness and weakness. Endo/Heme/Allergies: Does not bruise/bleed easily.      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 failure     bp stable    CAD (coronary artery disease)     4 way bypass    Cough     Decreased hearing     right ear    Depression     [facility]     Fatigue 9/5/2013    severe fatigue and sob for past few weeks r/o ichemia,cmp,anemia     HLD (hyperlipidemia)     HTN (hypertension)     Hypercholesterolemia     Hyperparathyroidism ([address]     Insomnia     Joint pain     Kidney stones     Low back pain     Lumbago     Lumbar spinal stenosis     Mild acid reflux     Mitral valve disorder     Obesity, unspecified     Patine has increased weight    Poliomyelitis     as a child with resulting right hand deformity    Postsurgical aortocoronary bypass status     Postsurgical percutaneous transluminal coronary angioplasty status     Stent of vein graft to RCA with YOSVANY/4/2008    Psychosexual dysfunction     Rosacea     stroke     x2  Jan 2006    Tricuspid valve disease     Vitamin D deficiency        Past Surgical History:   Procedure Laterality Date    HX APPENDECTOMY      HX CORONARY ARTERY BYPASS GRAFT      Four-vessel; and subsequent stents    HX CORONARY STENT PLACEMENT      Stent:VG to RCA and RT PDA beyond that/4/2008    HX HERNIA REPAIR      x3 left and right groin and epigastric area       Allergies   Allergen Reactions    Lisinopril Not Reported This Time    Morphine Other (comments)     GI distress    Niacin Rash and Itching    Zetia [Ezetimibe] Other (comments)     Acid reflux    Zocor [Simvastatin] Not Reported This Time       Current Outpatient Medications   Medication Sig    dilTIAZem ER (DILACOR XR) 120 mg capsule Take  by mouth daily.  losartan (COZAAR) 100 mg tablet Take 100 mg by mouth daily.  cephALEXin (KEFLEX) 500 mg capsule Take 500 mg by mouth two (2) times a day.  traMADol (ULTRAM) 50 mg tablet Take 1 Tab by mouth two (2) times daily as needed for Pain (1 tab po bid prn pain greater than 5/10.). Max Daily Amount: 100 mg.    omega-3 fatty acids-vitamin e 1,000 mg cap 1,000 mg.  cholecalciferol, vitamin D3, (VITAMIN D3) 2,000 unit tab Take  by mouth daily.  aspirin 81 mg tablet Take 81 mg by mouth daily.  rosuvastatin (CRESTOR) 20 mg tablet Take 20 mg by mouth nightly.  gabapentin (NEURONTIN) 300 mg tablet Take  by mouth two (2) times a day.  clopidogrel (PLAVIX) 75 mg tablet Take  by mouth daily.        No current facility-administered medications for this visit. Visit Vitals  /52 (BP 1 Location: Left arm, BP Patient Position: Sitting)   Pulse (!) 57   Temp 97.1 °F (36.2 °C)   Ht 5' 6\" (1.676 m)   Wt 81.6 kg (180 lb)   SpO2 94%   BMI 29.05 kg/m²         Physical Exam   Constitutional: He is oriented to person, place, and time. He appears well-developed and well-nourished. obese   HENT:   Head: Normocephalic and atraumatic. Eyes: Conjunctivae are normal.   Neck: Neck supple. No JVD present. No tracheal deviation present. No thyromegaly present. Cardiovascular: Normal rate, regular rhythm and normal heart sounds. Exam reveals no gallop and no friction rub. No murmur heard. Pulmonary/Chest: Breath sounds normal. No respiratory distress. He has no wheezes. He has no rales. He exhibits no tenderness. Abdominal: Soft. There is no abdominal tenderness. Musculoskeletal:         General: No edema. Neurological: He is alert and oriented to person, place, and time. Skin: Skin is warm and dry. Psychiatric: He has a normal mood and affect.      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 failure     bp stable    CAD (coronary artery disease)     4 way bypass    Cough     Decreased hearing     right ear    Depression     Diabetes ([address]     Fatigue 9/5/2013    severe fatigue and sob for past few weeks r/o ichemia,cmp,anemia     HLD (hyperlipidemia)     HTN (hypertension)     Hypercholesterolemia     Hyperparathyroidism ([address]     Insomnia     Joint pain     Kidney stones     Low back pain     Lumbago     Lumbar spinal stenosis     Mild acid reflux     Mitral valve disorder     Obesity, unspecified     Patine has increased weight    Poliomyelitis     as a child with resulting right hand deformity    Postsurgical aortocoronary bypass status     Postsurgical percutaneous transluminal coronary angioplasty status     Stent of vein graft to RCA with YOSVANY    Psychosexual dysfunction     Rosacea     stroke     x2  2006    Tricuspid valve disease     Vitamin D deficiency        Past Surgical History:   Procedure Laterality Date    HX APPENDECTOMY      HX CORONARY ARTERY BYPASS GRAFT      Four-vessel; and subsequent stents    HX CORONARY STENT PLACEMENT      Stent:VG to RCA and RT PDA beyond that/2008    HX HERNIA REPAIR      x3 left and right groin and epigastric area       Social History     Tobacco Use    Smoking status: Former Smoker     Packs/day: 1.00     Years: 24.00     Pack years: 24.00     Last attempt to quit: 1985     Years since quittin.5    Smokeless tobacco: Never Used   Substance Use Topics    Alcohol use: Yes     Alcohol/week: 10.0 standard drinks     Types: 12 Cans of beer per week       Allergies   Allergen Reactions    Lisinopril Not Reported This Time    Morphine Other (comments)     GI distress    Niacin Rash and Itching    Zetia [Ezetimibe] Other (comments)     Acid reflux    Zocor [Simvastatin] Not Reported This Time       Current Outpatient Medications   Medication Sig    dilTIAZem ER (DILACOR XR) 120 mg capsule Take  by mouth daily.  losartan (COZAAR) 100 mg tablet Take 100 mg by mouth daily.  cephALEXin (KEFLEX) 500 mg capsule Take 500 mg by mouth two (2) times a day.  traMADol (ULTRAM) 50 mg tablet Take 1 Tab by mouth two (2) times daily as needed for Pain (1 tab po bid prn pain greater than 5/10.). Max Daily Amount: 100 mg.    omega-3 fatty acids-vitamin e 1,000 mg cap 1,000 mg.  cholecalciferol, vitamin D3, (VITAMIN D3) 2,000 unit tab Take  by mouth daily.  aspirin 81 mg tablet Take 81 mg by mouth daily.  rosuvastatin (CRESTOR) 20 mg tablet Take 20 mg by mouth nightly.       gabapentin (NEURONTIN) 300 mg tablet Take  by mouth two (2) times a day.  clopidogrel (PLAVIX) 75 mg tablet Take  by mouth daily. No current facility-administered medications for this visit. Mr. Wili Ruggiero has a reminder for a \"due or due soon\" health maintenance. I have asked that he contact his primary care provider for follow-up on this health maintenance. Interpretation Summary   4/ 2019     · Estimated left ventricular ejection fraction is 56 - 60%. Left ventricular mild concentric hypertrophy. Abnormal left ventricular wall motion as described on the wall scoring diagram below. Moderate (grade 2) left ventricular diastolic dysfunction. · Right ventricular cavity size is moderately dilated. · Right atrial cavity size is moderately dilated. · Mild aortic valve sclerosis. · Mitral valve thickening. Mild mitral valve regurgitation. · Mild tricuspid valve regurgitation is present. There is no evidence of pulmonary hypertension. · Mild pulmonic valve regurgitation is present. Procedure Conclusion 4/2019    Nuclear Stress Test     Negative myocardial perfusion imaging. Myocardial perfusion imaging supports a low risk stress test.   There is a prior study available for comparison. Assessment       ICD-10-CM ICD-9-CM    1. Coronary artery disease involving native coronary artery of native heart without angina pectoris I25.10 414.01     Stable continue current medical management   2. Benign hypertensive heart disease without heart failure I11.9 402.10     Stable continue treatment   3. Hyperlipidemia, unspecified hyperlipidemia type E78.5 272.4     Continue treatment lab with PCP   4. Postsurgical percutaneous transluminal coronary angioplasty status Z98.61 V45.82     Stable monitor   5. Postsurgical aortocoronary bypass status Z95.1 V45.81     Stable         No orders of the defined types were placed in this encounter. Follow-up and Dispositions    · Return in about 6 months (around 1/2/2021).

## 2021-01-12 ENCOUNTER — OFFICE VISIT (OUTPATIENT)
Dept: CARDIOLOGY CLINIC | Age: 81
End: 2021-01-12
Payer: MEDICARE

## 2021-01-12 VITALS
WEIGHT: 181 LBS | HEIGHT: 66 IN | BODY MASS INDEX: 29.09 KG/M2 | OXYGEN SATURATION: 94 % | DIASTOLIC BLOOD PRESSURE: 62 MMHG | TEMPERATURE: 97.2 F | HEART RATE: 70 BPM | SYSTOLIC BLOOD PRESSURE: 135 MMHG

## 2021-01-12 DIAGNOSIS — E78.5 HYPERLIPIDEMIA, UNSPECIFIED HYPERLIPIDEMIA TYPE: ICD-10-CM

## 2021-01-12 DIAGNOSIS — I11.9 BENIGN HYPERTENSIVE HEART DISEASE WITHOUT HEART FAILURE: ICD-10-CM

## 2021-01-12 DIAGNOSIS — Z98.61 POSTSURGICAL PERCUTANEOUS TRANSLUMINAL CORONARY ANGIOPLASTY STATUS: ICD-10-CM

## 2021-01-12 DIAGNOSIS — I25.10 CORONARY ARTERY DISEASE INVOLVING NATIVE CORONARY ARTERY OF NATIVE HEART WITHOUT ANGINA PECTORIS: Primary | ICD-10-CM

## 2021-01-12 PROCEDURE — G8752 SYS BP LESS 140: HCPCS | Performed by: INTERNAL MEDICINE

## 2021-01-12 PROCEDURE — G8536 NO DOC ELDER MAL SCRN: HCPCS | Performed by: INTERNAL MEDICINE

## 2021-01-12 PROCEDURE — G8427 DOCREV CUR MEDS BY ELIG CLIN: HCPCS | Performed by: INTERNAL MEDICINE

## 2021-01-12 PROCEDURE — 1101F PT FALLS ASSESS-DOCD LE1/YR: CPT | Performed by: INTERNAL MEDICINE

## 2021-01-12 PROCEDURE — G8510 SCR DEP NEG, NO PLAN REQD: HCPCS | Performed by: INTERNAL MEDICINE

## 2021-01-12 PROCEDURE — G8419 CALC BMI OUT NRM PARAM NOF/U: HCPCS | Performed by: INTERNAL MEDICINE

## 2021-01-12 PROCEDURE — G8754 DIAS BP LESS 90: HCPCS | Performed by: INTERNAL MEDICINE

## 2021-01-12 PROCEDURE — 99214 OFFICE O/P EST MOD 30 MIN: CPT | Performed by: INTERNAL MEDICINE

## 2021-01-12 NOTE — PROGRESS NOTES
HISTORY OF PRESENT ILLNESS  Christine Arita is a 68 y.o. male. 1/2021  Patient with cad,post cabg and pci,htn. On follow up patient denies any chest pains,sob, palpitation or other significant symptoms. Hypertension  The history is provided by the patient. This is a chronic problem. The problem occurs constantly. The problem has not changed since onset. Pertinent negatives include no chest pain and no shortness of breath. Nothing aggravates the symptoms. Cholesterol Problem  The history is provided by the patient. This is a chronic problem. The problem occurs constantly. The problem has not changed since onset. Pertinent negatives include no chest pain and no shortness of breath. Review of Systems   Constitutional: Negative for chills, fever and malaise/fatigue. HENT: Negative for nosebleeds. Eyes: Negative for blurred vision and double vision. Respiratory: Negative for cough, hemoptysis, sputum production, shortness of breath and wheezing. Cardiovascular: Negative for chest pain, palpitations, orthopnea, claudication, leg swelling and PND. Gastrointestinal: Negative for heartburn, nausea and vomiting. Musculoskeletal: Negative for myalgias. Skin: Negative for rash. Neurological: Negative for dizziness and weakness. Endo/Heme/Allergies: Does not bruise/bleed easily.      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 failure     bp stable    CAD (coronary artery disease)     4 way bypass    Cough     Decreased hearing     right ear    Depression     Diabetes ([address]     Fatigue 9/5/2013    severe fatigue and sob for past few weeks r/o ichemia,cmp,anemia     HLD (hyperlipidemia)     HTN (hypertension)     Hypercholesterolemia     Hyperparathyroidism ([address]     Insomnia     Joint pain     Kidney stones     Low back pain     Lumbago     Lumbar spinal stenosis     Mild acid reflux     Mitral valve disorder     Obesity, unspecified     Patine has increased weight    Poliomyelitis     as a child with resulting right hand deformity    Postsurgical aortocoronary bypass status     Postsurgical percutaneous transluminal coronary angioplasty status     Stent of vein graft to RCA with YOSVANY/4/2008    Psychosexual dysfunction     Rosacea     stroke     x2  Jan 2006    Tricuspid valve disease     Vitamin D deficiency        Past Surgical History:   Procedure Laterality Date    HX APPENDECTOMY      HX CORONARY ARTERY BYPASS GRAFT      Four-vessel; and subsequent stents    HX CORONARY STENT PLACEMENT      Stent:VG to RCA and RT PDA beyond that/4/2008    HX HERNIA REPAIR      x3 left and right groin and epigastric area       Allergies   Allergen Reactions    Lisinopril Not Reported This Time    Morphine Other (comments)     GI distress    Niacin Rash and Itching    Zetia [Ezetimibe] Other (comments)     Acid reflux    Zocor [Simvastatin] Not Reported This Time       Current Outpatient Medications   Medication Sig    dilTIAZem ER (DILACOR XR) 120 mg capsule Take  by mouth daily.  losartan (COZAAR) 100 mg tablet Take 100 mg by mouth daily.  cephALEXin (KEFLEX) 500 mg capsule Take 500 mg by mouth two (2) times a day.  traMADol (ULTRAM) 50 mg tablet Take 1 Tab by mouth two (2) times daily as needed for Pain (1 tab po bid prn pain greater than 5/10.). Max Daily Amount: 100 mg.    omega-3 fatty acids-vitamin e 1,000 mg cap 1,000 mg.  cholecalciferol, vitamin D3, (VITAMIN D3) 2,000 unit tab Take  by mouth daily.  aspirin 81 mg tablet Take 81 mg by mouth daily.  rosuvastatin (CRESTOR) 20 mg tablet Take 20 mg by mouth nightly.  gabapentin (NEURONTIN) 300 mg tablet Take  by mouth two (2) times a day.  clopidogrel (PLAVIX) 75 mg tablet Take  by mouth daily. No current facility-administered medications for this visit. There were no vitals taken for this visit. Physical Exam   Constitutional: He is oriented to person, place, and time. He appears well-developed and well-nourished. obese   HENT:   Head: Normocephalic and atraumatic. Eyes: Conjunctivae are normal.   Neck: Neck supple. No JVD present. No tracheal deviation present. No thyromegaly present. Cardiovascular: Normal rate, regular rhythm and normal heart sounds. Exam reveals no gallop and no friction rub. No murmur heard. Pulmonary/Chest: Breath sounds normal. No respiratory distress. He has no wheezes. He has no rales. He exhibits no tenderness. Abdominal: Soft. There is no abdominal tenderness. Musculoskeletal:         General: No edema. Neurological: He is alert and oriented to person, place, and time. Skin: Skin is warm and dry. Psychiatric: He has a normal mood and affect.      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 failure     bp stable    CAD (coronary artery disease)     4 way bypass    Cough     Decreased hearing     right ear    Depression     Diabetes ([address]     Fatigue 9/5/2013    severe fatigue and sob for past few weeks r/o ichemia,cmp,anemia     HLD (hyperlipidemia)     HTN (hypertension)     Hypercholesterolemia     Hyperparathyroidism ([address]     Insomnia     Joint pain     Kidney stones     Low back pain     Lumbago     Lumbar spinal stenosis     Mild acid reflux     Mitral valve disorder     Obesity, unspecified     Patine has increased weight    Poliomyelitis     as a child with resulting right hand deformity    Postsurgical aortocoronary bypass status     Postsurgical percutaneous transluminal coronary angioplasty status     Stent of vein graft to RCA with YOSVANY    Psychosexual dysfunction     Rosacea     stroke     x2  2006    Tricuspid valve disease     Vitamin D deficiency        Past Surgical History:   Procedure Laterality Date    HX APPENDECTOMY      HX CORONARY ARTERY BYPASS GRAFT      Four-vessel; and subsequent stents    HX CORONARY STENT PLACEMENT      Stent:VG to RCA and RT PDA beyond that/2008    HX HERNIA REPAIR      x3 left and right groin and epigastric area       Social History     Tobacco Use    Smoking status: Former Smoker     Packs/day: 1.00     Years: 24.00     Pack years: 24.00     Quit date: 1985     Years since quittin.0    Smokeless tobacco: Never Used   Substance Use Topics    Alcohol use: Yes     Alcohol/week: 10.0 standard drinks     Types: 12 Cans of beer per week       Allergies   Allergen Reactions    Lisinopril Not Reported This Time    Morphine Other (comments)     GI distress    Niacin Rash and Itching    Zetia [Ezetimibe] Other (comments)     Acid reflux    Zocor [Simvastatin] Not Reported This Time       Current Outpatient Medications   Medication Sig    dilTIAZem ER (DILACOR XR) 120 mg capsule Take  by mouth daily.  losartan (COZAAR) 100 mg tablet Take 100 mg by mouth daily.  cephALEXin (KEFLEX) 500 mg capsule Take 500 mg by mouth two (2) times a day.  traMADol (ULTRAM) 50 mg tablet Take 1 Tab by mouth two (2) times daily as needed for Pain (1 tab po bid prn pain greater than 5/10.). Max Daily Amount: 100 mg.    omega-3 fatty acids-vitamin e 1,000 mg cap 1,000 mg.  cholecalciferol, vitamin D3, (VITAMIN D3) 2,000 unit tab Take  by mouth daily.  aspirin 81 mg tablet Take 81 mg by mouth daily.  rosuvastatin (CRESTOR) 20 mg tablet Take 20 mg by mouth nightly.  gabapentin (NEURONTIN) 300 mg tablet Take  by mouth two (2) times a day.  clopidogrel (PLAVIX) 75 mg tablet Take  by mouth daily.        No current facility-administered medications for this visit.        Mr. Ling Dotson has a reminder for a \"due or due soon\" health maintenance. I have asked that he contact his primary care provider for follow-up on this health maintenance. Interpretation Summary   4/ 2019     · Estimated left ventricular ejection fraction is 56 - 60%. Left ventricular mild concentric hypertrophy. Abnormal left ventricular wall motion as described on the wall scoring diagram below. Moderate (grade 2) left ventricular diastolic dysfunction. · Right ventricular cavity size is moderately dilated. · Right atrial cavity size is moderately dilated. · Mild aortic valve sclerosis. · Mitral valve thickening. Mild mitral valve regurgitation. · Mild tricuspid valve regurgitation is present. There is no evidence of pulmonary hypertension. · Mild pulmonic valve regurgitation is present. Procedure Conclusion 4/2019    Nuclear Stress Test     Negative myocardial perfusion imaging. Myocardial perfusion imaging supports a low risk stress test.   There is a prior study available for comparison. Assessment       ICD-10-CM ICD-9-CM    1. Coronary artery disease involving native coronary artery of native heart without angina pectoris  I25.10 414.01     Stable continue treatment monitor   2. Benign hypertensive heart disease without heart failure  I11.9 402.10     Continue treatment monitor   3. Hyperlipidemia, unspecified hyperlipidemia type  E78.5 272.4     Continue current therapy lab with PCP   4. Postsurgical percutaneous transluminal coronary angioplasty status  Z98.61 V45.82     Stable monitor     1/2021  Cardiac status stable. Continue current medical management lab done with PCP blood pressure controlled    No orders of the defined types were placed in this encounter.

## 2021-08-09 ENCOUNTER — OFFICE VISIT (OUTPATIENT)
Dept: CARDIOLOGY CLINIC | Age: 81
End: 2021-08-09
Payer: MEDICARE

## 2021-08-09 VITALS
HEART RATE: 54 BPM | DIASTOLIC BLOOD PRESSURE: 56 MMHG | WEIGHT: 177 LBS | SYSTOLIC BLOOD PRESSURE: 129 MMHG | HEIGHT: 66 IN | BODY MASS INDEX: 28.45 KG/M2

## 2021-08-09 DIAGNOSIS — Z98.61 POSTSURGICAL PERCUTANEOUS TRANSLUMINAL CORONARY ANGIOPLASTY STATUS: ICD-10-CM

## 2021-08-09 DIAGNOSIS — I25.10 CORONARY ARTERY DISEASE INVOLVING NATIVE CORONARY ARTERY OF NATIVE HEART WITHOUT ANGINA PECTORIS: Primary | ICD-10-CM

## 2021-08-09 DIAGNOSIS — E78.5 HYPERLIPIDEMIA, UNSPECIFIED HYPERLIPIDEMIA TYPE: ICD-10-CM

## 2021-08-09 DIAGNOSIS — I11.9 BENIGN HYPERTENSIVE HEART DISEASE WITHOUT HEART FAILURE: ICD-10-CM

## 2021-08-09 DIAGNOSIS — Z95.1 POSTSURGICAL AORTOCORONARY BYPASS STATUS: ICD-10-CM

## 2021-08-09 PROCEDURE — G8536 NO DOC ELDER MAL SCRN: HCPCS | Performed by: INTERNAL MEDICINE

## 2021-08-09 PROCEDURE — G8432 DEP SCR NOT DOC, RNG: HCPCS | Performed by: INTERNAL MEDICINE

## 2021-08-09 PROCEDURE — G8427 DOCREV CUR MEDS BY ELIG CLIN: HCPCS | Performed by: INTERNAL MEDICINE

## 2021-08-09 PROCEDURE — 99214 OFFICE O/P EST MOD 30 MIN: CPT | Performed by: INTERNAL MEDICINE

## 2021-08-09 PROCEDURE — G8752 SYS BP LESS 140: HCPCS | Performed by: INTERNAL MEDICINE

## 2021-08-09 PROCEDURE — G8419 CALC BMI OUT NRM PARAM NOF/U: HCPCS | Performed by: INTERNAL MEDICINE

## 2021-08-09 PROCEDURE — G8754 DIAS BP LESS 90: HCPCS | Performed by: INTERNAL MEDICINE

## 2021-08-09 PROCEDURE — 1101F PT FALLS ASSESS-DOCD LE1/YR: CPT | Performed by: INTERNAL MEDICINE

## 2021-08-09 NOTE — PROGRESS NOTES
1. Have you been to the ER, urgent care clinic since your last visit? Hospitalized since your last visit?     no    2. Have you seen or consulted any other health care providers outside of the [address] [place] [address] since your last visit? Include any pap smears or colon screening.       No

## 2021-08-09 NOTE — PROGRESS NOTES
HISTORY OF PRESENT ILLNESS  Alvina Castillo is a 68 y.o. male. 1/2021  Patient with cad,post cabg and pci,htn. On follow up patient denies any chest pains,sob, palpitation or other significant symptoms. 8/2021  Patient is here for follow-up. On follow up patient denies any chest pains,sob, palpitation or other significant symptoms. Hypertension  The history is provided by the patient. This is a chronic problem. The problem occurs constantly. The problem has not changed since onset. Pertinent negatives include no chest pain and no shortness of breath. Nothing aggravates the symptoms. Cholesterol Problem  The history is provided by the patient. This is a chronic problem. The problem occurs constantly. The problem has not changed since onset. Pertinent negatives include no chest pain and no shortness of breath. Review of Systems   Constitutional: Negative for chills, fever and malaise/fatigue. HENT: Negative for nosebleeds. Eyes: Negative for blurred vision and double vision. Respiratory: Negative for cough, hemoptysis, sputum production, shortness of breath and wheezing. Cardiovascular: Negative for chest pain, palpitations, orthopnea, claudication, leg swelling and PND. Gastrointestinal: Negative for heartburn, nausea and vomiting. Musculoskeletal: Negative for myalgias. Skin: Negative for rash. Neurological: Negative for dizziness and weakness. Endo/Heme/Allergies: Does not bruise/bleed easily.      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 failure     bp stable    CAD (coronary artery disease)     4 way bypass    Cough     Decreased hearing     right ear    Depression     Diabetes ([facility] [address]     Fatigue 9/5/2013    severe fatigue and sob for past few weeks r/o ichemia,cmp,anemia     HLD (hyperlipidemia)     HTN (hypertension)     Hypercholesterolemia     Hyperparathyroidism ([address]     Insomnia     Joint pain     Kidney stones     Low back pain     Lumbago     Lumbar spinal stenosis     Mild acid reflux     Mitral valve disorder     Obesity, unspecified     Patine has increased weight    Poliomyelitis     as a child with resulting right hand deformity    Postsurgical aortocoronary bypass status     Postsurgical percutaneous transluminal coronary angioplasty status     Stent of vein graft to RCA with YOSVANY/4/2008    Psychosexual dysfunction     Rosacea     stroke     x2  Jan 2006    Tricuspid valve disease     Vitamin D deficiency        Past Surgical History:   Procedure Laterality Date    HX APPENDECTOMY      HX CORONARY ARTERY BYPASS GRAFT      Four-vessel; and subsequent stents    HX CORONARY STENT PLACEMENT      Stent:VG to RCA and RT PDA beyond that/4/2008    HX HERNIA REPAIR      x3 left and right groin and epigastric area       Allergies   Allergen Reactions    Lisinopril Not Reported This Time    Morphine Other (comments)     GI distress    Niacin Rash and Itching    Zetia [Ezetimibe] Other (comments)     Acid reflux    Zocor [Simvastatin] Not Reported This Time       Current Outpatient Medications   Medication Sig    dilTIAZem ER (DILACOR XR) 120 mg capsule Take  by mouth daily.  losartan (COZAAR) 100 mg tablet Take 100 mg by mouth daily.  traMADol (ULTRAM) 50 mg tablet Take 1 Tab by mouth two (2) times daily as needed for Pain (1 tab po bid prn pain greater than 5/10.). Max Daily Amount: 100 mg.    omega-3 fatty acids-vitamin e 1,000 mg cap 1,000 mg.  cholecalciferol, vitamin D3, (VITAMIN D3) 2,000 unit tab Take  by mouth daily.  aspirin 81 mg tablet Take 81 mg by mouth daily.  rosuvastatin (CRESTOR) 20 mg tablet Take 20 mg by mouth nightly.  gabapentin (NEURONTIN) 300 mg tablet Take  by mouth two (2) times a day.  clopidogrel (PLAVIX) 75 mg tablet Take  by mouth daily. No current facility-administered medications for this visit. Visit Vitals  BP (!) 129/56   Pulse (!) 54   Ht 5' 6\" (1.676 m)   Wt 80.3 kg (177 lb)   BMI 28.57 kg/m²         Physical Exam  Constitutional:       Appearance: He is well-developed. HENT:      Head: Normocephalic and atraumatic. Eyes:      Conjunctiva/sclera: Conjunctivae normal.   Neck:      Thyroid: No thyromegaly. Vascular: No JVD. Trachea: No tracheal deviation. Cardiovascular:      Rate and Rhythm: Normal rate and regular rhythm. Heart sounds: Normal heart sounds. No murmur heard. No friction rub. No gallop. Pulmonary:      Effort: No respiratory distress. Breath sounds: Normal breath sounds. No wheezing or rales. Chest:      Chest wall: No tenderness. Abdominal:      Palpations: Abdomen is soft. Tenderness: There is no abdominal tenderness. Musculoskeletal:      Cervical back: Neck supple. Skin:     General: Skin is warm and dry. Neurological:      Mental Status: He is alert and oriented to person, place, and time.        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 failure     bp stable    CAD (coronary artery disease)     4 way bypass    Cough     Decreased hearing     right ear    Depression     Diabetes ([address]     Fatigue 9/5/2013    severe fatigue and sob for past few weeks r/o ichemia,cmp,anemia     HLD (hyperlipidemia)     HTN (hypertension)     Hypercholesterolemia     Hyperparathyroidism ([address]     Insomnia     Joint pain     Kidney stones     Low back pain     Lumbago     Lumbar spinal stenosis     Mild acid reflux     Mitral valve disorder     Obesity, unspecified     Patine has increased weight    Poliomyelitis     as a child with resulting right hand deformity    Postsurgical aortocoronary bypass status     Postsurgical percutaneous transluminal coronary angioplasty status     Stent of vein graft to RCA with YOSVANY    Psychosexual dysfunction     Rosacea     stroke     x2  2006    Tricuspid valve disease     Vitamin D deficiency        Past Surgical History:   Procedure Laterality Date    HX APPENDECTOMY      HX CORONARY ARTERY BYPASS GRAFT      Four-vessel; and subsequent stents    HX CORONARY STENT PLACEMENT      Stent:VG to RCA and RT PDA beyond that    HX HERNIA REPAIR      x3 left and right groin and epigastric area       Social History     Tobacco Use    Smoking status: Former Smoker     Packs/day: 1.00     Years: 24.00     Pack years: 24.00     Quit date: 1985     Years since quittin.6    Smokeless tobacco: Never Used   Substance Use Topics    Alcohol use: Yes     Alcohol/week: 10.0 standard drinks     Types: 12 Cans of beer per week       Allergies   Allergen Reactions    Lisinopril Not Reported This Time    Morphine Other (comments)     GI distress    Niacin Rash and Itching    Zetia [Ezetimibe] Other (comments)     Acid reflux    Zocor [Simvastatin] Not Reported This Time       Current Outpatient Medications   Medication Sig    dilTIAZem ER (DILACOR XR) 120 mg capsule Take  by mouth daily.  losartan (COZAAR) 100 mg tablet Take 100 mg by mouth daily.  traMADol (ULTRAM) 50 mg tablet Take 1 Tab by mouth two (2) times daily as needed for Pain (1 tab po bid prn pain greater than 5/10.). Max Daily Amount: 100 mg.    omega-3 fatty acids-vitamin e 1,000 mg cap 1,000 mg.  cholecalciferol, vitamin D3, (VITAMIN D3) 2,000 unit tab Take  by mouth daily.  aspirin 81 mg tablet Take 81 mg by mouth daily.  rosuvastatin (CRESTOR) 20 mg tablet Take 20 mg by mouth nightly.  gabapentin (NEURONTIN) 300 mg tablet Take  by mouth two (2) times a day.     clopidogrel (PLAVIX) 75 mg tablet Take  by mouth daily. No current facility-administered medications for this visit. Mr. Mendoza Presumrobert has a reminder for a \"due or due soon\" health maintenance. I have asked that he contact his primary care provider for follow-up on this health maintenance. Interpretation Summary   4/ 2019     · Estimated left ventricular ejection fraction is 56 - 60%. Left ventricular mild concentric hypertrophy. Abnormal left ventricular wall motion as described on the wall scoring diagram below. Moderate (grade 2) left ventricular diastolic dysfunction. · Right ventricular cavity size is moderately dilated. · Right atrial cavity size is moderately dilated. · Mild aortic valve sclerosis. · Mitral valve thickening. Mild mitral valve regurgitation. · Mild tricuspid valve regurgitation is present. There is no evidence of pulmonary hypertension. · Mild pulmonic valve regurgitation is present. Procedure Conclusion 4/2019    Nuclear Stress Test     Negative myocardial perfusion imaging. Myocardial perfusion imaging supports a low risk stress test.   There is a prior study available for comparison. Assessment       ICD-10-CM ICD-9-CM    1. Coronary artery disease involving native coronary artery of native heart without angina pectoris  I25.10 414.01     Stable continue treatment monitor   2. Benign hypertensive heart disease without heart failure  I11.9 402.10     Stable continue current therapy   3. Hyperlipidemia, unspecified hyperlipidemia type  E78.5 272.4     Continue treatment lab with PCP   4. Postsurgical percutaneous transluminal coronary angioplasty status  Z98.61 V45.82     Stable   5. Postsurgical aortocoronary bypass status  Z95.1 V45.81     Stable monitor     1/2021  Cardiac status stable. Continue current medical management lab done with PCP blood pressure controlled   8/2021  Stable cardiac status. Blood pressure controlled.   Lab done with PCP  No orders of the defined types were placed in this encounter. Follow-up and Dispositions    · Return in about 6 months (around 2/9/2022).

## 2021-09-22 ENCOUNTER — HOSPITAL ENCOUNTER (OUTPATIENT)
Dept: LAB | Age: 81
Discharge: HOME | End: 2021-09-22
Payer: MEDICARE

## 2021-09-22 PROCEDURE — 88305 TISSUE EXAM BY PATHOLOGIST: CPT

## 2021-09-22 PROCEDURE — 88304 TISSUE EXAM BY PATHOLOGIST: CPT

## 2022-03-20 PROBLEM — I51.7 ENLARGED RV (RIGHT VENTRICLE): Status: ACTIVE | Noted: 2019-05-07

## 2022-06-16 ENCOUNTER — OFFICE VISIT (OUTPATIENT)
Dept: CARDIOLOGY CLINIC | Age: 82
End: 2022-06-16
Payer: MEDICARE

## 2022-06-16 VITALS
WEIGHT: 175 LBS | HEIGHT: 66 IN | SYSTOLIC BLOOD PRESSURE: 117 MMHG | DIASTOLIC BLOOD PRESSURE: 50 MMHG | HEART RATE: 58 BPM | BODY MASS INDEX: 28.12 KG/M2 | OXYGEN SATURATION: 96 %

## 2022-06-16 DIAGNOSIS — E78.5 HYPERLIPIDEMIA, UNSPECIFIED HYPERLIPIDEMIA TYPE: ICD-10-CM

## 2022-06-16 DIAGNOSIS — I25.10 CORONARY ARTERY DISEASE INVOLVING NATIVE CORONARY ARTERY OF NATIVE HEART WITHOUT ANGINA PECTORIS: Primary | ICD-10-CM

## 2022-06-16 DIAGNOSIS — Z98.61 POSTSURGICAL PERCUTANEOUS TRANSLUMINAL CORONARY ANGIOPLASTY STATUS: ICD-10-CM

## 2022-06-16 DIAGNOSIS — I48.0 PAROXYSMAL ATRIAL FIBRILLATION (HCC): ICD-10-CM

## 2022-06-16 DIAGNOSIS — Z95.1 POSTSURGICAL AORTOCORONARY BYPASS STATUS: ICD-10-CM

## 2022-06-16 DIAGNOSIS — I11.9 BENIGN HYPERTENSIVE HEART DISEASE WITHOUT HEART FAILURE: ICD-10-CM

## 2022-06-16 PROCEDURE — G8752 SYS BP LESS 140: HCPCS | Performed by: INTERNAL MEDICINE

## 2022-06-16 PROCEDURE — G8536 NO DOC ELDER MAL SCRN: HCPCS | Performed by: INTERNAL MEDICINE

## 2022-06-16 PROCEDURE — 99214 OFFICE O/P EST MOD 30 MIN: CPT | Performed by: INTERNAL MEDICINE

## 2022-06-16 PROCEDURE — G8754 DIAS BP LESS 90: HCPCS | Performed by: INTERNAL MEDICINE

## 2022-06-16 PROCEDURE — G8427 DOCREV CUR MEDS BY ELIG CLIN: HCPCS | Performed by: INTERNAL MEDICINE

## 2022-06-16 PROCEDURE — 1123F ACP DISCUSS/DSCN MKR DOCD: CPT | Performed by: INTERNAL MEDICINE

## 2022-06-16 PROCEDURE — G8417 CALC BMI ABV UP PARAM F/U: HCPCS | Performed by: INTERNAL MEDICINE

## 2022-06-16 PROCEDURE — G8510 SCR DEP NEG, NO PLAN REQD: HCPCS | Performed by: INTERNAL MEDICINE

## 2022-06-16 PROCEDURE — 1101F PT FALLS ASSESS-DOCD LE1/YR: CPT | Performed by: INTERNAL MEDICINE

## 2022-06-16 NOTE — PROGRESS NOTES
HISTORY OF PRESENT ILLNESS  Jose Nolasco is a 80 y.o. male. 1/2021  Patient with cad,post cabg and pci,htn. On follow up patient denies any chest pains,sob, palpitation or other significant symptoms. 8/2021  Patient is here for follow-up. On follow up patient denies any chest pains,sob, palpitation or other significant symptoms. 6/2022  Patient is here for follow-up of his chronic conditions. Since last evaluation he was admitted to hospital 11/2021 with acute cholecystitis requiring surgery. During the course of the hospitalization he had transient atrial fibrillation. Hypertension  The history is provided by the patient. This is a chronic problem. The problem occurs constantly. The problem has not changed since onset. Pertinent negatives include no chest pain and no shortness of breath. Nothing aggravates the symptoms. Cholesterol Problem  The history is provided by the patient. This is a chronic problem. The problem occurs constantly. The problem has not changed since onset. Pertinent negatives include no chest pain and no shortness of breath. Follow-up  Pertinent negatives include no chest pain and no shortness of breath. Review of Systems   Constitutional: Negative for chills, fever and malaise/fatigue. HENT: Negative for nosebleeds. Eyes: Negative for blurred vision and double vision. Respiratory: Negative for cough, hemoptysis, sputum production, shortness of breath and wheezing. Cardiovascular: Negative for chest pain, palpitations, orthopnea, claudication, leg swelling and PND. Gastrointestinal: Negative for heartburn, nausea and vomiting. Musculoskeletal: Negative for myalgias. Skin: Negative for rash. Neurological: Negative for dizziness and weakness. Endo/Heme/Allergies: Does not bruise/bleed easily.      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 failure     bp stable    CAD (coronary artery disease)     4 way bypass    Cough     Decreased hearing     right ear    Depression     [facility] [address]     Fatigue 9/5/2013    severe fatigue and sob for past few weeks r/o ichemia,cmp,anemia     HLD (hyperlipidemia)     HTN (hypertension)     Hypercholesterolemia     Hyperparathyroidism ([address]     Insomnia     Joint pain     Kidney stones     Low back pain     Lumbago     Lumbar spinal stenosis     Mild acid reflux     Mitral valve disorder     Obesity, unspecified     Patine has increased weight    Poliomyelitis     as a child with resulting right hand deformity    Postsurgical aortocoronary bypass status     Postsurgical percutaneous transluminal coronary angioplasty status     Stent of vein graft to RCA with YOSVANY/4/2008    Psychosexual dysfunction     Rosacea     stroke     x2  Jan 2006    Tricuspid valve disease     Vitamin D deficiency        Past Surgical History:   Procedure Laterality Date    HX APPENDECTOMY      HX CORONARY ARTERY BYPASS GRAFT      Four-vessel; and subsequent stents    HX CORONARY STENT PLACEMENT      Stent:VG to RCA and RT PDA beyond that/4/2008    HX HERNIA REPAIR      x3 left and right groin and epigastric area       Allergies   Allergen Reactions    Lisinopril Not Reported This Time    Morphine Other (comments)     GI distress    Niacin Rash and Itching    Zetia [Ezetimibe] Other (comments)     Acid reflux    Zocor [Simvastatin] Not Reported This Time       Current Outpatient Medications   Medication Sig    dilTIAZem ER (DILACOR XR) 120 mg capsule Take  by mouth daily.  losartan (COZAAR) 100 mg tablet Take 100 mg by mouth daily.  traMADol (ULTRAM) 50 mg tablet Take 1 Tab by mouth two (2) times daily as needed for Pain (1 tab po bid prn pain greater than 5/10.).  Max Daily Amount: 100 mg.    omega-3 fatty acids-vitamin e 1,000 mg cap 1,000 mg.  cholecalciferol, vitamin D3, (VITAMIN D3) 2,000 unit tab Take  by mouth daily.  aspirin 81 mg tablet Take 81 mg by mouth daily.  rosuvastatin (CRESTOR) 20 mg tablet Take 20 mg by mouth nightly.  gabapentin (NEURONTIN) 300 mg tablet Take  by mouth two (2) times a day.  clopidogrel (PLAVIX) 75 mg tablet Take  by mouth daily. No current facility-administered medications for this visit. Visit Vitals  BP (!) 117/50 (BP 1 Location: Left upper arm, BP Patient Position: Sitting, BP Cuff Size: Adult)   Pulse (!) 58   Ht 5' 6\" (1.676 m)   Wt 79.4 kg (175 lb)   SpO2 96%   BMI 28.25 kg/m²         Physical Exam  Constitutional:       Appearance: He is well-developed. HENT:      Head: Normocephalic and atraumatic. Eyes:      Conjunctiva/sclera: Conjunctivae normal.   Neck:      Thyroid: No thyromegaly. Vascular: No JVD. Trachea: No tracheal deviation. Cardiovascular:      Rate and Rhythm: Normal rate and regular rhythm. Heart sounds: Normal heart sounds. No murmur heard. No friction rub. No gallop. Pulmonary:      Effort: No respiratory distress. Breath sounds: Normal breath sounds. No wheezing or rales. Chest:      Chest wall: No tenderness. Abdominal:      Palpations: Abdomen is soft. Tenderness: There is no abdominal tenderness. Musculoskeletal:      Cervical back: Neck supple. Skin:     General: Skin is warm and dry. Neurological:      Mental Status: He is alert and oriented to person, place, and time.        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 failure     bp stable    CAD (coronary artery disease)     4 way bypass    Cough     Decreased hearing     right ear    Depression     Diabetes ([address]     Fatigue 2013    severe fatigue and sob for past few weeks r/o ichemia,cmp,anemia     HLD (hyperlipidemia)     HTN (hypertension)     Hypercholesterolemia     Hyperparathyroidism ([address]     Insomnia     Joint pain     Kidney stones     Low back pain     Lumbago     Lumbar spinal stenosis     Mild acid reflux     Mitral valve disorder     Obesity, unspecified     Patine has increased weight    Poliomyelitis     as a child with resulting right hand deformity    Postsurgical aortocoronary bypass status     Postsurgical percutaneous transluminal coronary angioplasty status     Stent of vein graft to RCA with YOSVANY    Psychosexual dysfunction     Rosacea     stroke     x2  2006    Tricuspid valve disease     Vitamin D deficiency        Past Surgical History:   Procedure Laterality Date    HX APPENDECTOMY      HX CORONARY ARTERY BYPASS GRAFT      Four-vessel; and subsequent stents    HX CORONARY STENT PLACEMENT      Stent:VG to RCA and RT PDA beyond that    HX HERNIA REPAIR      x3 left and right groin and epigastric area       Social History     Tobacco Use    Smoking status: Former Smoker     Packs/day: 1.00     Years: 24.00     Pack years: 24.00     Quit date: 1985     Years since quittin.4    Smokeless tobacco: Never Used   Substance Use Topics    Alcohol use: Yes     Alcohol/week: 10.0 standard drinks     Types: 12 Cans of beer per week       Allergies   Allergen Reactions    Lisinopril Not Reported This Time    Morphine Other (comments)     GI distress    Niacin Rash and Itching    Zetia [Ezetimibe] Other (comments)     Acid reflux    Zocor [Simvastatin] Not Reported This Time       Current Outpatient Medications   Medication Sig    dilTIAZem ER (DILACOR XR) 120 mg capsule Take  by mouth daily.  losartan (COZAAR) 100 mg tablet Take 100 mg by mouth daily.     traMADol (ULTRAM) 50 mg tablet Take 1 Tab by mouth two (2) times daily as needed for Pain (1 tab po bid prn pain greater than 5/10.). Max Daily Amount: 100 mg.    omega-3 fatty acids-vitamin e 1,000 mg cap 1,000 mg.  cholecalciferol, vitamin D3, (VITAMIN D3) 2,000 unit tab Take  by mouth daily.  aspirin 81 mg tablet Take 81 mg by mouth daily.  rosuvastatin (CRESTOR) 20 mg tablet Take 20 mg by mouth nightly.  gabapentin (NEURONTIN) 300 mg tablet Take  by mouth two (2) times a day.  clopidogrel (PLAVIX) 75 mg tablet Take  by mouth daily. No current facility-administered medications for this visit. Mr. Aleshia Molina has a reminder for a \"due or due soon\" health maintenance. I have asked that he contact his primary care provider for follow-up on this health maintenance. Interpretation Summary   4/ 2019     · Estimated left ventricular ejection fraction is 56 - 60%. Left ventricular mild concentric hypertrophy. Abnormal left ventricular wall motion as described on the wall scoring diagram below. Moderate (grade 2) left ventricular diastolic dysfunction. · Right ventricular cavity size is moderately dilated. · Right atrial cavity size is moderately dilated. · Mild aortic valve sclerosis. · Mitral valve thickening. Mild mitral valve regurgitation. · Mild tricuspid valve regurgitation is present. There is no evidence of pulmonary hypertension. · Mild pulmonic valve regurgitation is present. Procedure Conclusion 4/2019    Nuclear Stress Test     Negative myocardial perfusion imaging. Myocardial perfusion imaging supports a low risk stress test.   There is a prior study available for comparison. Impression  - ABNORMAL ECG -    Impression  AFIB-Atrial boamlnjnkhvc-J-xyar 47-74, irreg A-activity    Impression  NIVCDL-Nonspecific IVCD with LAD-QRSd >115mS & LAD    Impression  LVH1-Left ventricular hypertrophy-multiple LVH criteria    Impression  L4SC-Badrnkhwbt T abnormalities, inferior leads-T flat/neg, II III aVF    Impression  -Motion artifact.  Disagree with afib interpretation.  Will repeat-    Resulting Agency  [facility]   Specimen Collected: 11/16/21  8:09 AM Last Resulted: 11/16/21  3:59 PM     Impression  - ABNORMAL ECG -    Impression  SR-Sinus rhythm-normal P axis, V-rate 50-99    Impression  APC-Atrial premature complex-SV complex w/ short R-R interval    Impression  SPR-Short MT interval-MT <110mS    Impression  NIVCDL-Nonspecific IVCD with LAD-QRSd >115mS & LAD    Impression  LVH1-Left ventricular hypertrophy-multiple LVH criteria    Impression  NSC-No significant change since prior tracing-          Assessment       ICD-10-CM ICD-9-CM    1. Coronary artery disease involving native coronary artery of native heart without angina pectoris  I25.10 414.01 ECHO ADULT COMPLETE    Stable symptoms continue treatment monitor   2. Benign hypertensive heart disease without heart failure  I11.9 402.10     Stable monitor   3. Hyperlipidemia, unspecified hyperlipidemia type  E78.5 272.4     Continue current treatment lab with PCP   4. Postsurgical percutaneous transluminal coronary angioplasty status  Z98.61 V45.82     Stable continue treatment   5. Postsurgical aortocoronary bypass status  Z95.1 V45.81     Stable   6. Paroxysmal atrial fibrillation (HCC)  I48.0 427.31 ECHO ADULT COMPLETE    Short episode happened during hospital admission during acute cholecystitis and infection. No recurrence     1/2021  Cardiac status stable. Continue current medical management lab done with PCP blood pressure controlled   8/2021  Stable cardiac status. Blood pressure controlled. Lab done with PCP  6/2022  Transient A. fib noted 11/2021 with acute cholecystitis and infection. .  No recurrence. Stable cardiac status.   Blood pressure controlled continue treatment          Orders Placed This Encounter    ECHO ADULT COMPLETE     Standing Status:   Future     Standing Expiration Date:   6/16/2023     Order Specific Question:   Contrast Enhancement (Bubble Study, Definity, Optison) may be used if criteria listed in established evidence-based protocol has been identified. Answer:   Yes       Follow-up and Dispositions    · Return for F/u after tests, Follow-up with Myridulce maria.

## 2022-06-16 NOTE — PROGRESS NOTES
1. Have you been to the ER, urgent care clinic since your last visit? Hospitalized since your last visit? Yes Where: [facility] for gallbladder    2. Have you seen or consulted any other health care providers outside of the [address] [place] [address] since your last visit? Include any pap smears or colon screening.  Yes Where: [facility] for f/u, Dr. Wendi Morataya PCP for f/u

## 2022-06-27 ENCOUNTER — OFFICE VISIT (OUTPATIENT)
Dept: CARDIOLOGY CLINIC | Age: 82
End: 2022-06-27
Payer: MEDICARE

## 2022-06-27 VITALS
HEIGHT: 66 IN | BODY MASS INDEX: 27.97 KG/M2 | OXYGEN SATURATION: 96 % | DIASTOLIC BLOOD PRESSURE: 64 MMHG | SYSTOLIC BLOOD PRESSURE: 137 MMHG | WEIGHT: 174 LBS | HEART RATE: 57 BPM

## 2022-06-27 DIAGNOSIS — I11.9 BENIGN HYPERTENSIVE HEART DISEASE WITHOUT HEART FAILURE: ICD-10-CM

## 2022-06-27 DIAGNOSIS — I25.10 CORONARY ARTERY DISEASE INVOLVING NATIVE CORONARY ARTERY OF NATIVE HEART WITHOUT ANGINA PECTORIS: Primary | ICD-10-CM

## 2022-06-27 DIAGNOSIS — Z98.61 POSTSURGICAL PERCUTANEOUS TRANSLUMINAL CORONARY ANGIOPLASTY STATUS: ICD-10-CM

## 2022-06-27 DIAGNOSIS — Z95.1 POSTSURGICAL AORTOCORONARY BYPASS STATUS: ICD-10-CM

## 2022-06-27 DIAGNOSIS — I48.0 PAROXYSMAL ATRIAL FIBRILLATION (HCC): ICD-10-CM

## 2022-06-27 DIAGNOSIS — E78.5 HYPERLIPIDEMIA, UNSPECIFIED HYPERLIPIDEMIA TYPE: ICD-10-CM

## 2022-06-27 PROCEDURE — 99214 OFFICE O/P EST MOD 30 MIN: CPT | Performed by: NURSE PRACTITIONER

## 2022-06-27 PROCEDURE — G8754 DIAS BP LESS 90: HCPCS | Performed by: NURSE PRACTITIONER

## 2022-06-27 PROCEDURE — G8432 DEP SCR NOT DOC, RNG: HCPCS | Performed by: NURSE PRACTITIONER

## 2022-06-27 PROCEDURE — 1123F ACP DISCUSS/DSCN MKR DOCD: CPT | Performed by: NURSE PRACTITIONER

## 2022-06-27 PROCEDURE — G8417 CALC BMI ABV UP PARAM F/U: HCPCS | Performed by: NURSE PRACTITIONER

## 2022-06-27 PROCEDURE — 1101F PT FALLS ASSESS-DOCD LE1/YR: CPT | Performed by: NURSE PRACTITIONER

## 2022-06-27 PROCEDURE — G8427 DOCREV CUR MEDS BY ELIG CLIN: HCPCS | Performed by: NURSE PRACTITIONER

## 2022-06-27 PROCEDURE — G8536 NO DOC ELDER MAL SCRN: HCPCS | Performed by: NURSE PRACTITIONER

## 2022-06-27 PROCEDURE — G8752 SYS BP LESS 140: HCPCS | Performed by: NURSE PRACTITIONER

## 2022-06-27 NOTE — PROGRESS NOTES
1. Have you been to the ER, urgent care clinic since your last visit? Hospitalized since your last visit?    no    2. Have you seen or consulted any other health care providers outside of the [address] [place] [address] since your last visit? Include any pap smears or colon screening.     no

## 2022-06-27 NOTE — PROGRESS NOTES
HISTORY OF PRESENT ILLNESS  Keisha Amato is a 80 y.o. male. 1/2021  Patient with cad,post cabg and pci,htn. On follow up patient denies any chest pains,sob, palpitation or other significant symptoms. 8/2021  Patient is here for follow-up. On follow up patient denies any chest pains,sob, palpitation or other significant symptoms. 6/2022  Patient is here for follow-up of his chronic conditions. Since last evaluation he was admitted to hospital 11/2021 with acute cholecystitis requiring surgery. During the course of the hospitalization he had transient atrial fibrillation. 6/27/2022  Patient presents to f/u for testing results. Patient reports is doing well. He denies chest pain, shortness of breath, palpitations or edema. He reports is undergoing evaluation for anemia he is anticipating colonoscopy and EGD next month. Follow-up  Pertinent negatives include no chest pain and no shortness of breath. Hypertension  The history is provided by the patient. This is a chronic problem. The problem occurs constantly. The problem has not changed since onset. Pertinent negatives include no chest pain and no shortness of breath. Nothing aggravates the symptoms. Cholesterol Problem  The history is provided by the patient. This is a chronic problem. The problem occurs constantly. The problem has not changed since onset. Pertinent negatives include no chest pain and no shortness of breath. Review of Systems   Constitutional: Negative for chills, fever and malaise/fatigue. HENT: Negative for nosebleeds. Eyes: Negative for blurred vision and double vision. Respiratory: Negative for cough, hemoptysis, sputum production, shortness of breath and wheezing. Cardiovascular: Negative for chest pain, palpitations, orthopnea, claudication, leg swelling and PND. Gastrointestinal: Negative for heartburn, nausea and vomiting. Musculoskeletal: Negative for myalgias. Skin: Negative for rash. Neurological: Negative for dizziness and weakness. Endo/Heme/Allergies: Does not bruise/bleed easily.      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 failure     bp stable    CAD (coronary artery disease)     4 way bypass    Cough     Decreased hearing     right ear    Depression     Diabetes ([address]     Fatigue 9/5/2013    severe fatigue and sob for past few weeks r/o ichemia,cmp,anemia     HLD (hyperlipidemia)     HTN (hypertension)     Hypercholesterolemia     Hyperparathyroidism ([address]     Insomnia     Joint pain     Kidney stones     Low back pain     Lumbago     Lumbar spinal stenosis     Mild acid reflux     Mitral valve disorder     Obesity, unspecified     Patine has increased weight    Poliomyelitis     as a child with resulting right hand deformity    Postsurgical aortocoronary bypass status     Postsurgical percutaneous transluminal coronary angioplasty status     Stent of vein graft to RCA with YOSVANY/4/2008    Psychosexual dysfunction     Rosacea     stroke     x2  Jan 2006    Tricuspid valve disease     Vitamin D deficiency        Past Surgical History:   Procedure Laterality Date    HX APPENDECTOMY      HX CORONARY ARTERY BYPASS GRAFT      Four-vessel; and subsequent stents    HX CORONARY STENT PLACEMENT      Stent:VG to RCA and RT PDA beyond that/4/2008    HX HERNIA REPAIR      x3 left and right groin and epigastric area       Allergies   Allergen Reactions    Lisinopril Not Reported This Time    Morphine Other (comments)     GI distress    Niacin Rash and Itching    Zetia [Ezetimibe] Other (comments)     Acid reflux    Zocor [Simvastatin] Not Reported This Time       Current Outpatient Medications   Medication Sig    dilTIAZem ER (DILACOR XR) 120 mg capsule Take  by mouth daily.  losartan (COZAAR) 100 mg tablet Take 100 mg by mouth daily.  traMADol (ULTRAM) 50 mg tablet Take 1 Tab by mouth two (2) times daily as needed for Pain (1 tab po bid prn pain greater than 5/10.). Max Daily Amount: 100 mg.    omega-3 fatty acids-vitamin e 1,000 mg cap 1,000 mg.  cholecalciferol, vitamin D3, (VITAMIN D3) 2,000 unit tab Take  by mouth daily.  aspirin 81 mg tablet Take 81 mg by mouth daily.  rosuvastatin (CRESTOR) 20 mg tablet Take 20 mg by mouth nightly.  gabapentin (NEURONTIN) 300 mg tablet Take  by mouth two (2) times a day.  clopidogrel (PLAVIX) 75 mg tablet Take  by mouth daily. No current facility-administered medications for this visit. Visit Vitals  /64   Pulse (!) 57   Ht 5' 6\" (1.676 m)   Wt 78.9 kg (174 lb)   SpO2 96%   BMI 28.08 kg/m²         Physical Exam  Vitals and nursing note reviewed. Constitutional:       Appearance: He is well-developed. HENT:      Head: Normocephalic and atraumatic. Eyes:      Conjunctiva/sclera: Conjunctivae normal.   Neck:      Thyroid: No thyromegaly. Vascular: No JVD. Trachea: No tracheal deviation. Cardiovascular:      Rate and Rhythm: Normal rate and regular rhythm. Heart sounds: Normal heart sounds. No murmur heard. No friction rub. No gallop. Pulmonary:      Effort: No respiratory distress. Breath sounds: Normal breath sounds. No wheezing or rales. Chest:      Chest wall: No tenderness. Abdominal:      Palpations: Abdomen is soft. Tenderness: There is no abdominal tenderness. Musculoskeletal:      Cervical back: Neck supple. Right lower leg: No edema. Left lower leg: No edema. Skin:     General: Skin is warm and dry. Neurological:      Mental Status: He is alert and oriented to person, place, and time.        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 failure     bp stable    CAD (coronary artery disease)     4 way bypass    Cough     Decreased hearing     right ear    Depression     Diabetes ([address]     Fatigue 2013    severe fatigue and sob for past few weeks r/o ichemia,cmp,anemia     HLD (hyperlipidemia)     HTN (hypertension)     Hypercholesterolemia     Hyperparathyroidism ([address]     Insomnia     Joint pain     Kidney stones     Low back pain     Lumbago     Lumbar spinal stenosis     Mild acid reflux     Mitral valve disorder     Obesity, unspecified     Patine has increased weight    Poliomyelitis     as a child with resulting right hand deformity    Postsurgical aortocoronary bypass status     Postsurgical percutaneous transluminal coronary angioplasty status     Stent of vein graft to RCA with YOSVANY    Psychosexual dysfunction     Rosacea     stroke     x2  2006    Tricuspid valve disease     Vitamin D deficiency        Past Surgical History:   Procedure Laterality Date    HX APPENDECTOMY      HX CORONARY ARTERY BYPASS GRAFT      Four-vessel; and subsequent stents    HX CORONARY STENT PLACEMENT      Stent:VG to RCA and RT PDA beyond that    HX HERNIA REPAIR      x3 left and right groin and epigastric area       Social History     Tobacco Use    Smoking status: Former Smoker     Packs/day: 1.00     Years: 24.00     Pack years: 24.00     Quit date: 1985     Years since quittin.5    Smokeless tobacco: Never Used   Substance Use Topics    Alcohol use:  Yes     Alcohol/week: 10.0 standard drinks     Types: 12 Cans of beer per week       Allergies   Allergen Reactions    Lisinopril Not Reported This Time    Morphine Other (comments)     GI distress    Niacin Rash and Itching    Zetia [Ezetimibe] Other (comments)     Acid reflux    Zocor [Simvastatin] Not Reported This Time Current Outpatient Medications   Medication Sig    dilTIAZem ER (DILACOR XR) 120 mg capsule Take  by mouth daily.  losartan (COZAAR) 100 mg tablet Take 100 mg by mouth daily.  traMADol (ULTRAM) 50 mg tablet Take 1 Tab by mouth two (2) times daily as needed for Pain (1 tab po bid prn pain greater than 5/10.). Max Daily Amount: 100 mg.    omega-3 fatty acids-vitamin e 1,000 mg cap 1,000 mg.  cholecalciferol, vitamin D3, (VITAMIN D3) 2,000 unit tab Take  by mouth daily.  aspirin 81 mg tablet Take 81 mg by mouth daily.  rosuvastatin (CRESTOR) 20 mg tablet Take 20 mg by mouth nightly.  gabapentin (NEURONTIN) 300 mg tablet Take  by mouth two (2) times a day.  clopidogrel (PLAVIX) 75 mg tablet Take  by mouth daily. No current facility-administered medications for this visit. Mr. Gerard Warren has a reminder for a \"due or due soon\" health maintenance. I have asked that he contact his primary care provider for follow-up on this health maintenance. Interpretation Summary   4/ 2019     · Estimated left ventricular ejection fraction is 56 - 60%. Left ventricular mild concentric hypertrophy. Abnormal left ventricular wall motion as described on the wall scoring diagram below. Moderate (grade 2) left ventricular diastolic dysfunction. · Right ventricular cavity size is moderately dilated. · Right atrial cavity size is moderately dilated. · Mild aortic valve sclerosis. · Mitral valve thickening. Mild mitral valve regurgitation. · Mild tricuspid valve regurgitation is present. There is no evidence of pulmonary hypertension. · Mild pulmonic valve regurgitation is present. Procedure Conclusion 4/2019    Nuclear Stress Test     Negative myocardial perfusion imaging. Myocardial perfusion imaging supports a low risk stress test.   There is a prior study available for comparison.      Impression  - ABNORMAL ECG -    Impression  AFIB-Atrial [id number]-L-kytk 47-74, irreg A-activity Impression  NIVCDL-Nonspecific IVCD with LAD-QRSd >115mS & LAD    Impression  LVH1-Left ventricular hypertrophy-multiple LVH criteria    Impression  R8GN-Myxurbptaq T abnormalities, inferior leads-T flat/neg, II III aVF    Impression  -Motion artifact.  Disagree with afib interpretation.  Will repeat-    Resulting Agency  [facility]   Specimen Collected: 11/16/21  8:09 AM Last Resulted: 11/16/21  3:59 PM     Impression  - ABNORMAL ECG -    Impression  SR-Sinus rhythm-normal P axis, V-rate 50-99    Impression  APC-Atrial premature complex-SV complex w/ short R-R interval    Impression  SPR-Short SC interval-SC <110mS    Impression  NIVCDL-Nonspecific IVCD with LAD-QRSd >115mS & LAD    Impression  LVH1-Left ventricular hypertrophy-multiple LVH criteria    Impression  NSC-No significant change since prior tracing-      6/2022  Echo  Interpretation Summary    Left Ventricle: Normal left ventricular systolic function with a visually estimated EF of 55 - 60%. Left ventricle size is normal. Mildly increased wall thickness. Findings consistent with mild concentric hypertrophy. Normal wall motion. Diastolic dysfunction present with normal LV EF.   Aortic Valve: Valve structure is normal. Mild sclerosis of the aortic valve cusp.   Mitral Valve: Valve structure is normal. Mildly calcified subvalvular apparatus.   Left Atrium: Left atrium is mildly dilated. LA Vol Index A/L is 39 mL/m2.   Right Atrium: Right atrium is mildly dilated. Comparison Study Information  Prior Study  When compared to the previous study from 4/25/19, there is no longer evidence of wall motion abnormalities, the left atrium now appears mildly dilated, and the right ventricular size now appears normal         Assessment       ICD-10-CM ICD-9-CM    1. Coronary artery disease involving native coronary artery of native heart without angina pectoris  I25.10 414.01     Stable, echo with normal LV function   2.  Paroxysmal atrial fibrillation ([facility]  I48.0 427.31     Short episode happened during hospital admission during acute cholecystitis and infection. No recurrence   3. Benign hypertensive heart disease without heart failure  I11.9 402.10     Stable monitor   4. Postsurgical aortocoronary bypass status  Z95.1 V45.81     Stable   5. Postsurgical percutaneous transluminal coronary angioplasty status  Z98.61 V45.82     Stable continue treatment     6. Hyperlipidemia, unspecified hyperlipidemia type  E78.5 272.4     Continue current treatment lab with PCP     1/2021  Cardiac status stable. Continue current medical management lab done with PCP blood pressure controlled   8/2021  Stable cardiac status. Blood pressure controlled. Lab done with PCP  6/2022  Transient A. fib noted 11/2021 with acute cholecystitis and infection. .  No recurrence. Stable cardiac status. Blood pressure controlled continue treatment  6/27/2022  Stable cardiac status. No recurrent of atrial fibrillation. Echocardiogram reviewed and discussed with patient, normal LV function,mild concentric hypertrophy. Normal wall motion. Diastolic dysfunction present with normal LV EF. B/P is controlled, continue current medications. OF note, patient is taking Aspirin and Plavix - will monitor due to anemia. No orders of the defined types were placed in this encounter. Follow-up and Dispositions    · Return in about 6 months (around 12/27/2022) for Follow up with Dr. Arturo Casillas.

## 2022-07-15 NOTE — PERIOP NOTES
PRE-SURGICAL INSTRUCTIONS        Patient's Name:  Eric Houston      GNHRS'U Date:  7/15/2022              Surgery Date:  7/19/2022                1. Do NOT eat or drink anything, including candy, gum, or ice chips after midnight on 7/18/2022, unless you have specific instructions from your surgeon or anesthesia provider to do so.  2. You may brush your teeth before coming to the hospital.  3. No smoking 24 hours prior to the day of surgery. 4. No alcohol 24 hours prior to the day of surgery. 5. No recreational drugs for one week prior to the day of surgery. 6. Leave all valuables, including money/purse, at home. 7. Remove all jewelry, nail polish,  no lotions powders, deodorant, or perfume/cologne/after shave on the skin. 8. Follow instruction for Hibiclens washes and CHG wipes from surgeon's office. 9. Glasses/contact lenses and dentures may be worn to the hospital.  They will be removed prior to surgery. 10. Call your doctor if symptoms of a cold or illness develop within 24-48 hours prior to your surgery. 11.  If you are having an outpatient procedure, please make arrangements for a responsible ADULT TO [address] [place] [address] and stay with you for 24 hours after your surgery. 12. ONE VISITOR in the hospital at this time for outpatient procedures. Exceptions may be made for surgical admissions, per nursing unit guidelines      Special Instructions:      Bring list of CURRENT medications. Bring covid vaccination card  Bring any pertinent legal medical records. Take these medications the morning of surgery with a sip of water: as directed by physician  Follow physician instructions about stopping anticoagulants. Complete bowel prep per MD instructions. On the day of surgery, come in the main entrance of DR. [facility]INTYRES [facility] [place] [facility]". Let the  at the desk know you are there for surgery. A staff member will come escort you to the surgical area on the second floor.     If you have any questions or concerns, please do not hesitate to call:     (Prior to the day of surgery) PAT department:  [phone]   (Day of surgery) Pre-Op department:  [phone]    These surgical instructions were reviewed with patient during the PAT phone call.

## 2022-07-18 ENCOUNTER — ANESTHESIA EVENT (OUTPATIENT)
Dept: ENDOSCOPY | Age: 82
End: 2022-07-18
Payer: MEDICARE

## 2022-07-19 ENCOUNTER — ANESTHESIA (OUTPATIENT)
Dept: ENDOSCOPY | Age: 82
End: 2022-07-19
Payer: MEDICARE

## 2022-07-19 ENCOUNTER — HOSPITAL ENCOUNTER (OUTPATIENT)
Age: 82
Setting detail: OUTPATIENT SURGERY
Discharge: HOME | End: 2022-07-19
Attending: INTERNAL MEDICINE | Admitting: INTERNAL MEDICINE
Payer: MEDICARE

## 2022-07-19 VITALS
OXYGEN SATURATION: 95 % | RESPIRATION RATE: 16 BRPM | BODY MASS INDEX: 27.77 KG/M2 | WEIGHT: 172.8 LBS | HEIGHT: 66 IN | SYSTOLIC BLOOD PRESSURE: 139 MMHG | HEART RATE: 55 BPM | DIASTOLIC BLOOD PRESSURE: 62 MMHG | TEMPERATURE: 97.5 F

## 2022-07-19 PROCEDURE — 88305 TISSUE EXAM BY PATHOLOGIST: CPT

## 2022-07-19 PROCEDURE — 74011250636 HC RX REV CODE- 250/636: Performed by: NURSE ANESTHETIST, CERTIFIED REGISTERED

## 2022-07-19 PROCEDURE — 99100 ANES PT EXTEME AGE<1 YR&>70: CPT | Performed by: ANESTHESIOLOGY

## 2022-07-19 PROCEDURE — 74011250636 HC RX REV CODE- 250/636: Performed by: ANESTHESIOLOGY

## 2022-07-19 PROCEDURE — 74011000250 HC RX REV CODE- 250: Performed by: ANESTHESIOLOGY

## 2022-07-19 PROCEDURE — 77030019988 HC FCPS ENDOSC DISP BSC -B: Performed by: INTERNAL MEDICINE

## 2022-07-19 PROCEDURE — 76040000007: Performed by: INTERNAL MEDICINE

## 2022-07-19 PROCEDURE — 00813 ANES UPR LWR GI NDSC PX: CPT | Performed by: ANESTHESIOLOGY

## 2022-07-19 PROCEDURE — 2709999900 HC NON-CHARGEABLE SUPPLY: Performed by: INTERNAL MEDICINE

## 2022-07-19 PROCEDURE — 77030021593 HC FCPS BIOP ENDOSC BSC -A: Performed by: INTERNAL MEDICINE

## 2022-07-19 PROCEDURE — 77030008565 HC TBNG SUC IRR ERBE -B: Performed by: INTERNAL MEDICINE

## 2022-07-19 PROCEDURE — 74011000250 HC RX REV CODE- 250: Performed by: NURSE ANESTHETIST, CERTIFIED REGISTERED

## 2022-07-19 PROCEDURE — 77030013992 HC SNR POLYP ENDOSC BSC -B: Performed by: INTERNAL MEDICINE

## 2022-07-19 PROCEDURE — 76060000032 HC ANESTHESIA 0.5 TO 1 HR: Performed by: INTERNAL MEDICINE

## 2022-07-19 RX ORDER — SODIUM CHLORIDE 0.9 % (FLUSH) 0.9 %
5-40 SYRINGE (ML) INJECTION AS NEEDED
Status: DISCONTINUED | OUTPATIENT
Start: 2022-07-19 | End: 2022-07-19 | Stop reason: HOSPADM

## 2022-07-19 RX ORDER — DEXTROSE MONOHYDRATE 100 MG/ML
0-250 INJECTION, SOLUTION INTRAVENOUS AS NEEDED
Status: DISCONTINUED | OUTPATIENT
Start: 2022-07-19 | End: 2022-07-19 | Stop reason: HOSPADM

## 2022-07-19 RX ORDER — LIDOCAINE HYDROCHLORIDE 10 MG/ML
0.1 INJECTION, SOLUTION EPIDURAL; INFILTRATION; INTRACAUDAL; PERINEURAL AS NEEDED
Status: DISCONTINUED | OUTPATIENT
Start: 2022-07-19 | End: 2022-07-19 | Stop reason: HOSPADM

## 2022-07-19 RX ORDER — SODIUM CHLORIDE 0.9 % (FLUSH) 0.9 %
5-40 SYRINGE (ML) INJECTION EVERY 8 HOURS
Status: DISCONTINUED | OUTPATIENT
Start: 2022-07-19 | End: 2022-07-19 | Stop reason: HOSPADM

## 2022-07-19 RX ORDER — PROPOFOL 10 MG/ML
INJECTION, EMULSION INTRAVENOUS AS NEEDED
Status: DISCONTINUED | OUTPATIENT
Start: 2022-07-19 | End: 2022-07-19 | Stop reason: HOSPADM

## 2022-07-19 RX ORDER — INSULIN LISPRO 100 [IU]/ML
INJECTION, SOLUTION INTRAVENOUS; SUBCUTANEOUS ONCE
Status: DISCONTINUED | OUTPATIENT
Start: 2022-07-19 | End: 2022-07-19 | Stop reason: HOSPADM

## 2022-07-19 RX ORDER — SODIUM CHLORIDE 9 MG/ML
25 INJECTION, SOLUTION INTRAVENOUS CONTINUOUS
Status: DISCONTINUED | OUTPATIENT
Start: 2022-07-19 | End: 2022-07-19 | Stop reason: HOSPADM

## 2022-07-19 RX ORDER — SODIUM CHLORIDE, SODIUM LACTATE, POTASSIUM CHLORIDE, CALCIUM CHLORIDE 600; 310; 30; 20 MG/100ML; MG/100ML; MG/100ML; MG/100ML
INJECTION, SOLUTION INTRAVENOUS
Status: DISCONTINUED | OUTPATIENT
Start: 2022-07-19 | End: 2022-07-19 | Stop reason: HOSPADM

## 2022-07-19 RX ORDER — MAGNESIUM SULFATE 100 %
4 CRYSTALS MISCELLANEOUS AS NEEDED
Status: DISCONTINUED | OUTPATIENT
Start: 2022-07-19 | End: 2022-07-19 | Stop reason: HOSPADM

## 2022-07-19 RX ORDER — LIDOCAINE HYDROCHLORIDE 20 MG/ML
INJECTION, SOLUTION EPIDURAL; INFILTRATION; INTRACAUDAL; PERINEURAL AS NEEDED
Status: DISCONTINUED | OUTPATIENT
Start: 2022-07-19 | End: 2022-07-19 | Stop reason: HOSPADM

## 2022-07-19 RX ADMIN — SODIUM CHLORIDE 25 ML/HR: 900 INJECTION, SOLUTION INTRAVENOUS at 08:52

## 2022-07-19 RX ADMIN — PROPOFOL 25 MG: 10 INJECTION, EMULSION INTRAVENOUS at 09:23

## 2022-07-19 RX ADMIN — PROPOFOL 25 MG: 10 INJECTION, EMULSION INTRAVENOUS at 09:19

## 2022-07-19 RX ADMIN — FAMOTIDINE 20 MG: 10 INJECTION INTRAVENOUS at 08:54

## 2022-07-19 RX ADMIN — PROPOFOL 25 MG: 10 INJECTION, EMULSION INTRAVENOUS at 09:30

## 2022-07-19 RX ADMIN — PROPOFOL 50 MG: 10 INJECTION, EMULSION INTRAVENOUS at 09:12

## 2022-07-19 RX ADMIN — PROPOFOL 25 MG: 10 INJECTION, EMULSION INTRAVENOUS at 09:15

## 2022-07-19 RX ADMIN — PROPOFOL 25 MG: 10 INJECTION, EMULSION INTRAVENOUS at 09:27

## 2022-07-19 RX ADMIN — SODIUM CHLORIDE, SODIUM LACTATE, POTASSIUM CHLORIDE, AND CALCIUM CHLORIDE: 600; 310; 30; 20 INJECTION, SOLUTION INTRAVENOUS at 09:05

## 2022-07-19 RX ADMIN — PROPOFOL 25 MG: 10 INJECTION, EMULSION INTRAVENOUS at 09:10

## 2022-07-19 RX ADMIN — PROPOFOL 25 MG: 10 INJECTION, EMULSION INTRAVENOUS at 09:11

## 2022-07-19 RX ADMIN — LIDOCAINE HYDROCHLORIDE 60 MG: 20 INJECTION, SOLUTION EPIDURAL; INFILTRATION; INTRACAUDAL; PERINEURAL at 09:10

## 2022-07-19 NOTE — DISCHARGE INSTRUCTIONS
Patient Education        Upper GI Endoscopy: What to Expect at [address] had an upper GI endoscopy. Your doctor used a thin, lighted tube that bends to look at the inside of your esophagus, your stomach, and the first part of the small intestine, called the duodenum. After you have an endoscopy, you will stay at the hospital or clinic for 1 to 2 hours. This will allow the medicine to wear off. You will be able to go home after your doctor or nurse checks to make sure that you're not having any problems. You may have to stay overnight if you had treatment during the test. You may have a sore throat for a day or two after the test.  This care sheet gives you a general idea about what to expect after the test.  How can you care for yourself at home? Activity   · Rest as much as you need to after you go home. · You should be able to go back to your usual activities the day after the test.  Diet   · Follow your doctor's directions for eating after the test.  · Drink plenty of fluids (unless your doctor has told you not to). Medications   · If you have a sore throat the day after the test, use an over-the-counter spray to numb your throat. Follow-up care is a key part of your treatment and safety. Be sure to make and go to all appointments, and call your doctor if you are having problems. It's also a good idea to know your test results and keep a list of the medicines you take. When should you call for help? Call 911 anytime you think you may need emergency care. For example, call if:    · You passed out (lost consciousness).     · You have trouble breathing.     · You pass maroon or bloody stools.    Call your doctor now or seek immediate medical care if:    · You have pain that does not get better after your take pain medicine.     · You have new or worse belly pain.     · You have blood in your stools.     · You are sick to your stomach and cannot keep fluids down.     · You have a fever.     · You cannot pass stools or gas. Watch closely for changes in your health, and be sure to contact your doctor if:    · Your throat still hurts after a day or two.     · You do not get better as expected. Where can you learn more? Go to http://www.bhatti.com/  Enter J454 in the search box to learn more about \"Upper GI Endoscopy: What to Expect at Home. \"  Current as of: September 8, 2021               Content Version: 13.2  © 2006-2022 [employer]. Care instructions adapted under license by [employer] (which disclaims liability or warranty for this information). If you have questions about a medical condition or this instruction, always ask your healthcare professional. [address] any warranty or liability for your use of this information. Patient Education        Colonoscopy: What to Expect at Home  Your Recovery  After a colonoscopy, you'll stay at the clinic until you wake up. Then you can go home. But you'll need to arrange for a ride. Your doctor will tell you when you can eat and do your other usual activities. Your doctor will talk to you about when you'll need your next colonoscopy. Your doctor can help you decide how often you need to be checked. This will depend on the results of your test and your risk for colorectal cancer. After the test, you may be bloated or have gas pains. You may need to pass gas. If a biopsy was done or a polyp was removed, you may have streaks of blood in your stool (feces) for a few days. Problems such as heavy rectal bleeding may not occur until several weeks after the test. This isn't common. But it can happen after polyps are removed. This care sheet gives you a general idea about how long it will take for you to recover. But each person recovers at a different pace. Follow the steps below to get better as quickly as possible. How can you care for yourself at home?   Activity    · Rest when you feel tired.     · You can do your normal activities when it feels okay to do so. Diet    · Follow your doctor's directions for eating.     · Unless your doctor has told you not to, drink plenty of fluids. This helps to replace the fluids that were lost during the colon prep.     · Do not drink alcohol. Medicines    · Your doctor will tell you if and when you can restart your medicines. You will also be given instructions about taking any new medicines.     · If you take aspirin or some other blood thinner, ask your doctor if and when to start taking it again. Make sure that you understand exactly what your doctor wants you to do.     · If polyps were removed or a biopsy was done during the test, your doctor may tell you not to take aspirin or other anti-inflammatory medicines for a few days. These include ibuprofen (Advil, Motrin) and naproxen (Aleve). Other instructions    · For your safety, do not drive or operate machinery until the medicine wears off and you can think clearly. Your doctor may tell you not to drive or operate machinery until the day after your test.     · Do not sign legal documents or make major decisions until the medicine wears off and you can think clearly. The anesthesia can make it hard for you to fully understand what you are agreeing to. Follow-up care is a key part of your treatment and safety. Be sure to make and go to all appointments, and call your doctor if you are having problems. It's also a good idea to know your test results and keep a list of the medicines you take. When should you call for help? Call 911 anytime you think you may need emergency care. For example, call if:    · You passed out (lost consciousness).     · You pass maroon or bloody stools.     · You have trouble breathing.    Call your doctor now or seek immediate medical care if:    · You have pain that does not get better after you take pain medicine.     · You are sick to your stomach or cannot drink fluids.     · You have new or worse belly pain.     · You have blood in your stools.     · You have a fever.     · You cannot pass stools or gas. Watch closely for changes in your health, and be sure to contact your doctor if you have any problems. Where can you learn more? Go to http://www.gray.com/  Enter E264 in the search box to learn more about \"Colonoscopy: What to Expect at Home. \"  Current as of: September 8, 2021               Content Version: 13.2  © 5855-3018 [employer]. Care instructions adapted under license by [employer] (which disclaims liability or warranty for this information). If you have questions about a medical condition or this instruction, always ask your healthcare professional. [address] any warranty or liability for your use of this information. Patient Education        Colon Polyps: Care Instructions  Your Care Instructions     Colon polyps are growths in the colon or the rectum. The cause of most colon polyps is not known, and most people who get them do not have any problems. But a certain kind can turn into cancer. For this reason, regular testing for colon polyps is important for people as they get older. It is also important for anyone who has an increased risk for colon cancer. Polyps are usually found through routine colon cancer screening tests. Although most colon polyps are not cancerous, they are usually removed and then tested for cancer. Screening for colon cancer saves lives because the cancer can usually be cured if it is caught early. If you have a polyp that is the type that can turn into cancer, you may need more tests to examine your entire colon. The doctor will remove any other polyps that he or she finds, and you will be tested more often. Follow-up care is a key part of your treatment and safety.  Be sure to make and go to all appointments, and call your doctor if you are having problems. It's also a good idea to know your test results and keep a list of the medicines you take. How can you care for yourself at home? Regular exams to look for colon polyps are the best way to prevent polyps from turning into colon cancer. These can include stool tests, sigmoidoscopy, colonoscopy, and CT colonography. Talk with your doctor about a testing schedule that is right for you. To prevent polyps  There is no home treatment that can prevent colon polyps. But these steps may help lower your risk for cancer. · Stay active. Being active can help you get to and stay at a healthy weight. Try to exercise on most days of the week. Walking is a good choice. · Eat well. Choose a variety of vegetables, fruits, legumes (such as peas and beans), fish, poultry, and whole grains. · Do not smoke. If you need help quitting, talk to your doctor about stop-smoking programs and medicines. These can increase your chances of quitting for good. · If you drink alcohol, limit how much you drink. Limit alcohol to 2 drinks a day for men and 1 drink a day for women. When should you call for help? Call your doctor now or seek immediate medical care if:    · You have severe belly pain.     · Your stools are maroon or very bloody. Watch closely for changes in your health, and be sure to contact your doctor if:    · You have a fever.     · You have nausea or vomiting.     · You have a change in bowel habits (new constipation or diarrhea).     · Your symptoms get worse or are not improving as expected. Where can you learn more? Go to http://www.[employer].com/  Enter C571 in the search box to learn more about \"Colon Polyps: Care Instructions. \"  Current as of: September 8, 2021               Content Version: 13.2  © 9729-9260 [employer]. Care instructions adapted under license by [employer] (which disclaims liability or warranty for this information).  If you have questions about a medical condition or this instruction, always ask your healthcare professional. [address] any warranty or liability for your use of this information. DISCHARGE SUMMARY from Nurse    PATIENT INSTRUCTIONS:    After general anesthesia or intravenous sedation, for 24 hours or while taking prescription Narcotics:  · Limit your activities  · Do not drive and operate hazardous machinery  · Do not make important personal or business decisions  · Do  not drink alcoholic beverages  · If you have not urinated within 8 hours after discharge, please contact your surgeon on call. Report the following to your surgeon:  · Excessive pain, swelling, redness or odor of or around the surgical area  · Temperature over 100.5  · Nausea and vomiting lasting longer than 4 hours or if unable to take medications  · Any signs of decreased circulation or nerve impairment to extremity: change in color, persistent  numbness, tingling, coldness or increase pain  · Any questions        These are general instructions for a healthy lifestyle:    No smoking/ No tobacco products/ Avoid exposure to second hand smoke  Surgeon General's Warning:  Quitting smoking now greatly reduces serious risk to your health. Obesity, smoking, and sedentary lifestyle greatly increases your risk for illness    A healthy diet, regular physical exercise & weight monitoring are important for maintaining a healthy lifestyle    You may be retaining fluid if you have a history of heart failure or if you experience any of the following symptoms:  Weight gain of 3 pounds or more overnight or 5 pounds in a week, increased swelling in our hands or feet or shortness of breath while lying flat in bed. Please call your doctor as soon as you notice any of these symptoms; do not wait until your next office visit. The discharge information has been reviewed with the patient.   The patient verbalized understanding. Discharge medications reviewed with the patient and appropriate educational materials and side effects teaching were provided.   ___________________________________________________________________________________________________________________________________

## 2022-07-19 NOTE — ANESTHESIA POSTPROCEDURE EVALUATION
Procedure(s):  UPPER ENDOSCOPY with bxs  COLONOSCOPY with polypectomies. MAC    Anesthesia Post Evaluation      Multimodal analgesia: multimodal analgesia used between 6 hours prior to anesthesia start to PACU discharge  Patient location during evaluation: bedside  Patient participation: complete - patient participated  Level of consciousness: awake  Pain management: adequate  Airway patency: patent  Anesthetic complications: no  Cardiovascular status: stable  Respiratory status: acceptable  Hydration status: acceptable  Post anesthesia nausea and vomiting:  controlled      INITIAL Post-op Vital signs:   Vitals Value Taken Time   /56 07/19/22 1001   Temp 36.6 °C (97.8 °F) 07/19/22 1001   Pulse 52 07/19/22 1005   Resp 16 07/19/22 1005   SpO2 98 % 07/19/22 1005   Vitals shown include unvalidated device data.

## 2022-07-19 NOTE — H&P
[email or web address]  [place], [address]        Impression:   1. MATIAS      Plan:     1. eg colo mac all risks benefits and alt discussed       Chief Complaint: MATIAS      HPI:  Betsey Henry is a 80 y.o. male who is being seen on consult for MATIAS. PMH:   Past Medical History:   Diagnosis Date    Actinic keratosis     Arrhythmia 11/16/2021    Paroxysmal atrial fibrillation- Short episode happened during hospital admission during acute cholecystitis and infection.   No recurrence    Arthritis     Benign hypertensive heart disease without heart failure     bp stable    CAD (coronary artery disease)     4 way bypass    Cough     Decreased hearing     right ear    Depression     Diabetes ([address]     Fatigue 9/5/2013    severe fatigue and sob for past few weeks r/o ichemia,cmp,anemia     HLD (hyperlipidemia)     HTN (hypertension)     Hypercholesterolemia     Hyperparathyroidism ([address]     Insomnia     Joint pain     Kidney stones     Low back pain     Lumbago     Lumbar spinal stenosis     Mild acid reflux     Mitral valve disorder     Obesity, unspecified     Patine has increased weight    Polio     Poliomyelitis     as a child with resulting right hand deformity    Postsurgical aortocoronary bypass status     Postsurgical percutaneous transluminal coronary angioplasty status     Stent of vein graft to RCA with YOSVANY/4/2008    Psychosexual dysfunction     Rosacea     stroke     x2  Jan 2006    Tricuspid valve disease     Vitamin D deficiency        PSH:   Past Surgical History:   Procedure Laterality Date    HX APPENDECTOMY      HX CHOLECYSTECTOMY  11/16/2021    Gangrenous cholecystitis     HX CORONARY ARTERY BYPASS GRAFT  2005    2005-Four-vessel; and 2008-subsequent stents    HX CORONARY STENT PLACEMENT      Stent:VG to RCA and RT PDA beyond that/4/2008    HX HERNIA REPAIR      x3 left and right groin and epigastric area       Social HX:   Social History     Socioeconomic History    Marital status:      Spouse name: Not on file    Number of children: Not on file    Years of education: Not on file    Highest education level: Not on file   Occupational History    Not on file   Tobacco Use    Smoking status: Former Smoker     Packs/day: 1.00     Years: 24.00     Pack years: 24.00     Quit date: 1985     Years since quittin.5    Smokeless tobacco: Never Used   Vaping Use    Vaping Use: Never used   Substance and Sexual Activity    Alcohol use: Not Currently     Alcohol/week: 10.0 standard drinks     Types: 12 Cans of beer per week    Drug use: Never    Sexual activity: Not on file   Other Topics Concern    Not on file   Social History Narrative    Not on file     Social Determinants of Health     Financial Resource Strain:     Difficulty of Paying Living Expenses: Not on file   Food Insecurity:     Worried About [address] in the Last Year: Not on file    [address] in the Last Year: Not on file   Transportation Needs:     Lack of Transportation (Medical): Not on file    Lack of Transportation (Non-Medical):  Not on file   Physical Activity:     Days of Exercise per Week: Not on file    Minutes of Exercise per Session: Not on file   Stress:     Feeling of Stress : Not on file   Social Connections:     Frequency of Communication with Friends and Family: Not on file    Frequency of Social Gatherings with Friends and Family: Not on file    Attends Sikhism Services: Not on file    Active Member of Clubs or Organizations: Not on file    Attends Club or Organization Meetings: Not on file    Marital Status: Not on file   Intimate Partner Violence:     Fear of Current or Ex-Partner: Not on file    Emotionally Abused: Not on file    Physically Abused: Not on file    Sexually Abused: Not on file   Housing Stability:     Unable to Pay for Housing in the Last Year: Not on file    Number of Places Lived in the Last Year: Not on file    Unstable Housing in the Last Year: Not on file       FHX:   Family History   Problem Relation Age of Onset    Heart Disease Father     Stroke Father     Heart Disease Brother        Allergy:   Allergies   Allergen Reactions    Lisinopril Not Reported This Time    Morphine Other (comments)     GI distress    Niacin Rash and Itching    Zetia [Ezetimibe] Other (comments)     Acid reflux    Zocor [Simvastatin] Not Reported This Time       Home Medications:     Medications Prior to Admission   Medication Sig    dilTIAZem ER (DILACOR XR) 120 mg capsule Take  by mouth daily.  losartan (COZAAR) 100 mg tablet Take 100 mg by mouth daily.  traMADol (ULTRAM) 50 mg tablet Take 1 Tab by mouth two (2) times daily as needed for Pain (1 tab po bid prn pain greater than 5/10.). Max Daily Amount: 100 mg.  aspirin 81 mg tablet Take 81 mg by mouth daily.  rosuvastatin (CRESTOR) 20 mg tablet Take 20 mg by mouth nightly.  gabapentin (NEURONTIN) 300 mg tablet Take  by mouth two (2) times a day.  omega-3 fatty acids-vitamin e 1,000 mg cap 1,000 mg.  cholecalciferol, vitamin D3, (VITAMIN D3) 2,000 unit tab Take  by mouth daily.  clopidogrel (PLAVIX) 75 mg tablet Take  by mouth daily. Review of Systems:     Constitutional: No fevers, chills, weight loss, fatigue. Skin: No rashes, pruritis, jaundice, ulcerations, erythema. HENT: No headaches, nosebleeds, sinus pressure, rhinorrhea, sore throat. Eyes: No visual changes, blurred vision, eye pain, photophobia, jaundice. Cardiovascular: No chest pain, heart palpitations. Respiratory: No cough, SOB, wheezing, chest discomfort, orthopnea. Gastrointestinal: neg   Genitourinary: No dysuria, bleeding, discharge, pyuria. Musculoskeletal: No weakness, arthralgias, wasting. Endo: No sweats. Heme: No bruising, easy bleeding. Allergies: As noted. Neurological: Cranial nerves intact. Alert and oriented. Gait not assessed. Psychiatric:  No anxiety, depression, hallucinations. Visit Vitals  Ht 5' 6\" (1.676 m)   Wt 78.9 kg (174 lb)   BMI 28.08 kg/m²       Physical Assessment:     constitutional: appearance: well developed, well nourished, normal habitus, no deformities, in no acute distress. skin: inspection: no rashes, ulcers, icterus or other lesions; no clubbing or telangiectasias. palpation: no induration or subcutaneos nodules. eyes: inspection: normal conjunctivae and lids; no jaundice pupils: symmetrical, normoreactive to light, normal accommodation and size. ENMT: mouth: normal oral mucosa,lips and gums; good dentition. oropharynx: normal tongue, hard and soft palate; posterior pharynx without erythema, exudate or lesions. neck: no masses organomegaly or tenderness. respiratory: effort: normal chest excursion; no intercostal retraction or accessory muscle use. cardiovascular: abdominal aorta: normal size and position; no bruits. palpation: PMI of normal size and position; normal rhythm; no thrill or murmurs. abdominal: abdomen: normal consistency; no tenderness or masses. hernias: no hernias appreciated. liver: normal size and consistency. spleen: not palpable. rectal: hemoccult/guaiac: not performed. musculoskeletal: no deformities or muscle wasting   lymphatic: axilae: not palpable. groin: not palpable. neck: within normal limits. other: not palpable. neurologic: cranial nerves: II-XII normal.   psychiatric: judgement/insight: within normal limits. memory: within normal limits for recent and remote events. mood and affect: no evidence of depression, anxiety or agitation. orientation: oriented to time, space and person. Basic Metabolic Profile   No results for input(s): NA, K, CL, CO2, BUN, GLU, CA, MG, PHOS in the last 72 hours.     No lab exists for component: CREAT      CBC w/Diff    No results for input(s): WBC, RBC, HGB, HCT, MCV, MCH, MCHC, RDW, PLT, HGBEXT, HCTEXT, PLTEXT in the last 72 hours. No lab exists for component: MPV No results for input(s): GRANS, LYMPH, EOS, PRO, MYELO, METAS, BLAST in the last 72 hours. No lab exists for component: MONO, BASO     Hepatic Function   No results for input(s): ALB, TP, TBILI, AP, AML, LPSE in the last 72 hours. No lab exists for component: DBILI, GPT, SGOT       Haylie Armas MD, M.D. [facility], [address] [place] [address]  [email or web address]. [facility]

## 2022-07-19 NOTE — ANESTHESIA PREPROCEDURE EVALUATION
Relevant Problems   No relevant active problems       Anesthetic History               Review of Systems / Medical History  Patient summary reviewed, nursing notes reviewed and pertinent labs reviewed    Pulmonary                   Neuro/Psych       CVA  TIA and psychiatric history     Cardiovascular    Hypertension        Dysrhythmias   CAD    Exercise tolerance: >4 METS     GI/Hepatic/Renal                Endo/Other    Diabetes    Morbid obesity and arthritis     Other Findings              Physical Exam    Airway  Mallampati: III  TM Distance: 4 - 6 cm  Neck ROM: normal range of motion   Mouth opening: Normal     Cardiovascular  Regular rate and rhythm,  S1 and S2 normal,  no murmur, click, rub, or gallop  Rhythm: regular  Rate: normal         Dental         Pulmonary  Breath sounds clear to auscultation               Abdominal  GI exam deferred       Other Findings            Anesthetic Plan    ASA: 3  Anesthesia type: MAC    Monitoring Plan: Continuous noninvasive hemodynamic monitoring      Induction: Intravenous  Anesthetic plan and risks discussed with: Patient and Son / Daughter

## 2022-08-15 NOTE — PATIENT INSTRUCTIONS
"Subjective:       Patient ID: Xavier Flores is a 78 y.o. male.    Chief Complaint: Follow-up (PVR check)  This is a 78 y.o.  male patient that is an established patient of mine.  He was self referred to me for urinary frequency. He states that it is actually more bothersome to him during the day now. Overall his urinary frequency has been bothersome over the past 10 years. He states it's now "flipped" and his nocturia which is now 3x/night is not as bothersome. He felt that the flomax irritated his stomach. Oxybutynin made him feel "goofy" at night. He finished the course of what was prescribed but did not continue. He states he does not recall a great benefit.     He has seen Dr. Knight once in the past 3/2016. Note reviewed - complained of urinary frequency, incomplete emptying, intermittency, nocturia x 4, straining, urgency, weak stream. stops drinking fluids 7pm.  Has "a beer" with dinner which he eats at 11am.  Doesn't drink much caffeinated drinks, maybe one diet coke early in am. At the time was started on flomax and oxybutynin 5mg BID.     Uroflow 144cc; qmax 10ml/s; qavg 4ml/s  PVR 14cc  poct urinalysis +trace blood    His AUA symptom score today was 24/5 unhappy  Incomplete emptying 3  Frequency:  5  Intermittency:  3  Urgency:  4  Weak stream:  4  Strainin  Nocturia:  3  Bother:   5, Unhappy    We have tried numerous meds to improve his urinary urgency/frequency. Biggest complaint was daytime urgency/frequency.   20 - flomax irritated his stomach and since his main complaint was urgency/frequency, started him on oxybutynin 5mg XL. Daytime incontinence resolved, frequency improved.  PVR 16cc. Stopped clonazepam and dizziness resolved.    20 - Very satisfied with daytime sx. Nocturia still at best 3-4x/night and he is interested in seeing if we can improve on that. Increased oxybutynin to 10mg XL.     10/6/20 -He returns back for f/u after he tried oxybutynin 5mg XL and 10mg XL. PVR 23cc. " Heart-Healthy Diet: Care Instructions  Your Care Instructions     A heart-healthy diet has lots of vegetables, fruits, nuts, beans, and whole grains, and is low in salt. It limits foods that are high in saturated fat, such as meats, cheeses, and fried foods. It may be hard to change your diet, but even small changes can lower your risk of heart attack and heart disease. Follow-up care is a key part of your treatment and safety. Be sure to make and go to all appointments, and call your doctor if you are having problems. It's also a good idea to know your test results and keep a list of the medicines you take. How can you care for yourself at home? Watch your portions  · Use food labels to learn what the recommended servings are for the foods you eat. · Eat only the number of calories you need to stay at a healthy weight. If you need to lose weight, eat fewer calories than your body burns (through exercise and other physical activity). Eat more fruits and vegetables  · Eat a variety of fruit and vegetables every day. Dark green, deep orange, red, or yellow fruits and vegetables are especially good for you. Examples include spinach, carrots, peaches, and berries. · Keep carrots, celery, and other veggies handy for snacks. Buy fruit that is in season and store it where you can see it so that you will be tempted to eat it. · Cook dishes that have a lot of veggies in them, such as stir-fries and soups. Limit saturated fat  · Read food labels, and try to avoid saturated fats. They increase your risk of heart disease. · Use olive or canola oil when you cook. · Bake, broil, grill, or steam foods instead of frying them. · Choose lean meats instead of high-fat meats such as hot dogs and sausages. Cut off all visible fat when you prepare meat. · Eat fish, skinless poultry, and meat alternatives such as soy products instead of high-fat meats.  Soy products, such as tofu, may be especially good for your   Nocturia - sometimes 2x/night now. He notes that he felt more like himself when he was taking the 5mg XL daily. Decreased to 5mg XL.    11/2/21 Pt wanted to try oxybutynin 10mg XL. Sent to pharmacy.    1/4/22  Nocturia now 1x/day. DTF - 5x/ hour at its worst. Dry mouth worsened with oxybutynin 10mg. He is taking it in the mornings. Switched to vesicare 5mg due to dry mouth SE too unbearable with oxybutynin.      2/15/22   Significantly improved. Pt smiling at visit today.   Nocturia 1x; 2x rarely.   DTF - qhour now - more space in between voids (prior was q15 min and q30 minutes)   vesicare not too much adverse SE, mild dry mouth.   PVR 66mL    8/15/22  Is taking vesicare 5mg. Here for PVR check. PVR today is 8cc. Every now and then dry mouth bothersome, so will take a vesicare holiday. And then he will restart.   DTF - 3-5x.   NTF - 2x         LAST PSA  Lab Results   Component Value Date    PSA 0.73 10/28/2021    PSA 0.56 11/20/2019    PSA 0.47 09/27/2018    PSA 0.56 09/20/2017    PSA 0.57 07/12/2016    PSA 0.53 09/16/2015    PSA 0.47 09/15/2014    PSA 0.44 08/05/2013    PSA 0.68 08/23/2012    PSA 0.45 09/07/2011    PSA 0.30 09/28/2010    PSA 0.31 10/16/2009    PSA 0.3 08/09/2007    PSA 0.3 08/02/2006    PSA 0.5 07/27/2005    PSA 1.1 04/14/2004       Lab Results   Component Value Date    CREATININE 0.8 08/06/2022       ---  Past Medical History:   Diagnosis Date    Arthritis     Erectile dysfunction     HEARING LOSS     Insomnia     Restless leg syndrome        Past Surgical History:   Procedure Laterality Date    FRACTURE SURGERY Right     high school playong foot ball , 1 inch abovethe knee     LAPAROSCOPIC CHOLECYSTECTOMY N/A 8/5/2022    Procedure: CHOLECYSTECTOMY, LAPAROSCOPIC;  Surgeon: Brigido Ramachandran MD;  Location: [facility];  Service: General;  Laterality: N/A;    OPEN REDUCTION AND INTERNAL FIXATION (ORIF) OF INJURY OF HIP Right 4/14/2021    Procedure: ORIF, HIP;  Surgeon: Sonam Gonzalez MD;   heart.  · Choose low-fat or fat-free milk and dairy products. Eat foods high in fiber  · Eat a variety of grain products every day. Include whole-grain foods that have lots of fiber and nutrients. Examples of whole-grain foods include oats, whole wheat bread, and brown rice. · Buy whole-grain breads and cereals, instead of white bread or pastries. Limit salt and sodium  · Limit how much salt and sodium you eat to help lower your blood pressure. · Taste food before you salt it. Add only a little salt when you think you need it. With time, your taste buds will adjust to less salt. · Eat fewer snack items, fast foods, and other high-salt, processed foods. Check food labels for the amount of sodium in packaged foods. · Choose low-sodium versions of canned goods (such as soups, vegetables, and beans). Limit sugar  · Limit drinks and foods with added sugar. These include candy, desserts, and soda pop. Limit alcohol  · Limit alcohol to no more than 2 drinks a day for men and 1 drink a day for women. Too much alcohol can cause health problems. When should you call for help? Watch closely for changes in your health, and be sure to contact your doctor if:    · You would like help planning heart-healthy meals. Where can you learn more? Go to http://www.bhatti.com/  Enter V137 in the search box to learn more about \"Heart-Healthy Diet: Care Instructions. \"  Current as of: September 8, 2021               Content Version: 13.2  © 2006-2022 [employer]. Care instructions adapted under license by [employer] (which disclaims liability or warranty for this information). If you have questions about a medical condition or this instruction, always ask your healthcare professional. [place] [address] any warranty or liability for your use of this information. Location: [facility] OR;  Service: Orthopedics;  Laterality: Right;    PATELLA FRACTURE SURGERY      TOTAL KNEE ARTHROPLASTY Right 6/14/2019    Procedure: ARTHROPLASTY, KNEE, TOTAL;  Surgeon: Johnny Camargo III, MD;  Location: [address];  Service: Orthopedics;  Laterality: Right;       Family History   Problem Relation Age of Onset    Hypertension Father     Stroke Father     Hypertension Sister     Hypertension Brother     Transient ischemic attack Brother     No Known Problems Mother     Hypertension Daughter        Social History     Tobacco Use    Smoking status: Never Smoker    Smokeless tobacco: Never Used   Substance Use Topics    Alcohol use: Yes     Alcohol/week: 7.0 standard drinks     Types: 7 Cans of beer per week     Comment: 1 beer with dinner every night    Drug use: No       Current Outpatient Medications on File Prior to Visit   Medication Sig Dispense Refill    acetaminophen (TYLENOL) 500 MG tablet Take 2 tablets (1,000 mg total) by mouth every 6 (six) hours as needed for Pain.      amLODIPine-benazepriL (LOTREL) 10-40 mg per capsule Take 1 capsule by mouth once daily. 90 capsule 3    aspirin (ECOTRIN) 81 MG EC tablet Take 81 mg by mouth once daily.      fish oil-omega-3 fatty acids 300-1,000 mg capsule Take 2 g by mouth once daily.      hydrOXYzine HCL (ATARAX) 25 MG tablet Take 1 tablet (25 mg total) by mouth nightly as needed (insomnia). 30 tablet 5    oxyCODONE (ROXICODONE) 5 MG immediate release tablet Take 1 tablet (5 mg total) by mouth every 6 (six) hours as needed for Pain. 12 tablet 0    solifenacin (VESICARE) 5 MG tablet Take 1 tablet (5 mg total) by mouth once daily. 90 tablet 3     Current Facility-Administered Medications on File Prior to Visit   Medication Dose Route Frequency Provider Last Rate Last Admin    fentaNYL injection 25 mcg  25 mcg Intravenous Q5 Min PRN Xiomy Thakkar MD   100 mcg at 08/05/22 5743    midazolam (VERSED) 1 mg/mL injection 0.5  mg  0.5 mg Intravenous PRN Xiomy Thakkar MD           Review of patient's allergies indicates:  No Known Allergies    Review of Systems   Constitutional: Negative for chills.   HENT: Negative for congestion.    Eyes: Negative for visual disturbance.   Respiratory: Negative for shortness of breath.    Cardiovascular: Negative for chest pain.   Gastrointestinal: Negative for abdominal distention.   Musculoskeletal: Negative for gait problem.   Skin: Negative for color change.   Neurological: Negative for dizziness.   Psychiatric/Behavioral: Negative for agitation.       Objective:      Physical Exam  Constitutional:       Appearance: He is well-developed.   HENT:      Head: Normocephalic.   Eyes:      Pupils: Pupils are equal, round, and reactive to light.   Pulmonary:      Effort: Pulmonary effort is normal.   Abdominal:      Palpations: Abdomen is soft.   Musculoskeletal:         General: Normal range of motion.      Cervical back: Normal range of motion.   Skin:     General: Skin is warm and dry.   Neurological:      Mental Status: He is alert.         Assessment:       1. BPH with obstruction/lower urinary tract symptoms    2. Urinary urgency    3. Urine frequency        Plan:       1. We will continue visits every 6 months.  to arrange.   2. Has enough refills of vesicare 5mg at 90 day supplies.        BPH with obstruction/lower urinary tract symptoms  -     POCT Bladder Scan    Urinary urgency    Urine frequency

## 2023-04-04 ENCOUNTER — OFFICE VISIT (OUTPATIENT)
Age: 83
End: 2023-04-04
Payer: MEDICARE

## 2023-04-04 VITALS
SYSTOLIC BLOOD PRESSURE: 108 MMHG | OXYGEN SATURATION: 95 % | HEIGHT: 66 IN | DIASTOLIC BLOOD PRESSURE: 53 MMHG | WEIGHT: 179 LBS | HEART RATE: 60 BPM | BODY MASS INDEX: 28.77 KG/M2

## 2023-04-04 DIAGNOSIS — I11.9 HYPERTENSIVE HEART DISEASE WITHOUT HEART FAILURE: ICD-10-CM

## 2023-04-04 DIAGNOSIS — Z95.1 PRESENCE OF AORTOCORONARY BYPASS GRAFT: ICD-10-CM

## 2023-04-04 DIAGNOSIS — E78.5 HYPERLIPIDEMIA, UNSPECIFIED HYPERLIPIDEMIA TYPE: ICD-10-CM

## 2023-04-04 DIAGNOSIS — I48.0 PAROXYSMAL ATRIAL FIBRILLATION (HCC): ICD-10-CM

## 2023-04-04 DIAGNOSIS — I25.10 ATHEROSCLEROSIS OF NATIVE CORONARY ARTERY OF NATIVE HEART WITHOUT ANGINA PECTORIS: Primary | ICD-10-CM

## 2023-04-04 PROCEDURE — 3074F SYST BP LT 130 MM HG: CPT | Performed by: INTERNAL MEDICINE

## 2023-04-04 PROCEDURE — 1123F ACP DISCUSS/DSCN MKR DOCD: CPT | Performed by: INTERNAL MEDICINE

## 2023-04-04 PROCEDURE — 99214 OFFICE O/P EST MOD 30 MIN: CPT | Performed by: INTERNAL MEDICINE

## 2023-04-04 PROCEDURE — G8417 CALC BMI ABV UP PARAM F/U: HCPCS | Performed by: INTERNAL MEDICINE

## 2023-04-04 PROCEDURE — 3078F DIAST BP <80 MM HG: CPT | Performed by: INTERNAL MEDICINE

## 2023-04-04 PROCEDURE — G8427 DOCREV CUR MEDS BY ELIG CLIN: HCPCS | Performed by: INTERNAL MEDICINE

## 2023-04-04 PROCEDURE — 1036F TOBACCO NON-USER: CPT | Performed by: INTERNAL MEDICINE

## 2023-04-04 RX ORDER — CEPHALEXIN 500 MG/1
500 CAPSULE ORAL 2 TIMES DAILY
COMMUNITY

## 2023-04-04 RX ORDER — ASPIRIN 81 MG/1
81 TABLET ORAL DAILY
COMMUNITY

## 2023-04-04 ASSESSMENT — PATIENT HEALTH QUESTIONNAIRE - PHQ9
SUM OF ALL RESPONSES TO PHQ9 QUESTIONS 1 & 2: 0
SUM OF ALL RESPONSES TO PHQ QUESTIONS 1-9: 0
DEPRESSION UNABLE TO ASSESS: FUNCTIONAL CAPACITY MOTIVATION LIMITS ACCURACY
SUM OF ALL RESPONSES TO PHQ QUESTIONS 1-9: 0
1. LITTLE INTEREST OR PLEASURE IN DOING THINGS: 0
SUM OF ALL RESPONSES TO PHQ QUESTIONS 1-9: 0
SUM OF ALL RESPONSES TO PHQ QUESTIONS 1-9: 0
2. FEELING DOWN, DEPRESSED OR HOPELESS: 0

## 2023-04-04 NOTE — PROGRESS NOTES
1. Have you been to the ER, urgent care clinic since your last visit? Hospitalized since your last visit? No    2. Have you seen or consulted any other health care providers outside of the [address] [place] [address] since your last visit? Include any pap smears or colon screening.       No
Allergies   Allergen Reactions    Ezetimibe Other (See Comments)     Acid reflux    Lisinopril      Other reaction(s): Not Reported This Time    Morphine Other (See Comments)     GI distress    Simvastatin      Other reaction(s): Not Reported This Time    Niacin Itching and Rash       Prior to Admission medications    Medication Sig Start Date End Date Taking? Authorizing Provider   aspirin 81 MG EC tablet Take 1 tablet by mouth daily   Yes Historical Provider, MD   cephALEXin (KEFLEX) 500 MG capsule Take 1 capsule by mouth 2 times daily   Yes Historical Provider, MD   Cholecalciferol 50 MCG (2000 UT) TABS Take by mouth daily   Yes Ar Automatic Reconciliation   clopidogrel (PLAVIX) 75 MG tablet Take by mouth daily   Yes Ar Automatic Reconciliation   dilTIAZem (TIAZAC) 120 MG extended release capsule Take by mouth daily   Yes Ar Automatic Reconciliation   Gabapentin, Once-Daily, 300 MG TABS Take by mouth 2 times daily. Yes Ar Automatic Reconciliation   losartan (COZAAR) 100 MG tablet Take 1 tablet by mouth daily   Yes Ar Automatic Reconciliation   rosuvastatin (CRESTOR) 20 MG tablet Take 1 tablet by mouth   Yes Ar Automatic Reconciliation   traMADol (ULTRAM) 50 MG tablet Take 1 tablet by mouth 2 times daily as needed. 5/24/16  Yes Ar Automatic Reconciliation         BP (!) 108/53 (Site: Left Upper Arm, Position: Sitting, Cuff Size: Medium Adult)   Pulse 60   Ht 5' 6\" (1.676 m)   Wt 179 lb (81.2 kg)   SpO2 95%   BMI 28.89 kg/m²   Interpretation Summary   4/ 2019     Estimated left ventricular ejection fraction is 56 - 60%. Left ventricular mild concentric hypertrophy. Abnormal left ventricular wall motion as described on the wall scoring diagram below. Moderate (grade 2) left ventricular diastolic dysfunction. Right ventricular cavity size is moderately dilated. Right atrial cavity size is moderately dilated. Mild aortic valve sclerosis. Mitral valve thickening. Mild mitral valve regurgitation.   Mild

## 2023-10-06 ENCOUNTER — OFFICE VISIT (OUTPATIENT)
Age: 83
End: 2023-10-06
Payer: MEDICARE

## 2023-10-06 VITALS
BODY MASS INDEX: 28.28 KG/M2 | OXYGEN SATURATION: 96 % | WEIGHT: 176 LBS | HEIGHT: 66 IN | SYSTOLIC BLOOD PRESSURE: 109 MMHG | DIASTOLIC BLOOD PRESSURE: 49 MMHG | HEART RATE: 62 BPM

## 2023-10-06 DIAGNOSIS — Z98.61 CORONARY ANGIOPLASTY STATUS: ICD-10-CM

## 2023-10-06 DIAGNOSIS — I11.9 HYPERTENSIVE HEART DISEASE WITHOUT HEART FAILURE: ICD-10-CM

## 2023-10-06 DIAGNOSIS — I48.0 PAROXYSMAL ATRIAL FIBRILLATION (HCC): ICD-10-CM

## 2023-10-06 DIAGNOSIS — I25.10 ATHEROSCLEROSIS OF NATIVE CORONARY ARTERY OF NATIVE HEART WITHOUT ANGINA PECTORIS: Primary | ICD-10-CM

## 2023-10-06 DIAGNOSIS — Z95.1 PRESENCE OF AORTOCORONARY BYPASS GRAFT: ICD-10-CM

## 2023-10-06 PROCEDURE — G8427 DOCREV CUR MEDS BY ELIG CLIN: HCPCS | Performed by: INTERNAL MEDICINE

## 2023-10-06 PROCEDURE — 1036F TOBACCO NON-USER: CPT | Performed by: INTERNAL MEDICINE

## 2023-10-06 PROCEDURE — 1123F ACP DISCUSS/DSCN MKR DOCD: CPT | Performed by: INTERNAL MEDICINE

## 2023-10-06 PROCEDURE — 3074F SYST BP LT 130 MM HG: CPT | Performed by: INTERNAL MEDICINE

## 2023-10-06 PROCEDURE — 3078F DIAST BP <80 MM HG: CPT | Performed by: INTERNAL MEDICINE

## 2023-10-06 PROCEDURE — G8484 FLU IMMUNIZE NO ADMIN: HCPCS | Performed by: INTERNAL MEDICINE

## 2023-10-06 PROCEDURE — 99214 OFFICE O/P EST MOD 30 MIN: CPT | Performed by: INTERNAL MEDICINE

## 2023-10-06 PROCEDURE — G8417 CALC BMI ABV UP PARAM F/U: HCPCS | Performed by: INTERNAL MEDICINE

## 2023-10-06 RX ORDER — OMEGA-3/DHA/EPA/FISH OIL 300-1000MG
1000 CAPSULE ORAL
COMMUNITY

## 2023-10-06 ASSESSMENT — PATIENT HEALTH QUESTIONNAIRE - PHQ9
2. FEELING DOWN, DEPRESSED OR HOPELESS: 0
SUM OF ALL RESPONSES TO PHQ9 QUESTIONS 1 & 2: 0
SUM OF ALL RESPONSES TO PHQ QUESTIONS 1-9: 0
DEPRESSION UNABLE TO ASSESS: FUNCTIONAL CAPACITY MOTIVATION LIMITS ACCURACY
SUM OF ALL RESPONSES TO PHQ QUESTIONS 1-9: 0
1. LITTLE INTEREST OR PLEASURE IN DOING THINGS: 0
SUM OF ALL RESPONSES TO PHQ QUESTIONS 1-9: 0
SUM OF ALL RESPONSES TO PHQ QUESTIONS 1-9: 0

## 2023-10-06 NOTE — PROGRESS NOTES
1. Have you been to the ER, urgent care clinic since your last visit? Hospitalized since your last visit? No    2. Have you seen or consulted any other health care providers outside of the [address] [place] [address] since your last visit? Include any pap smears or colon screening.       No
treatment      5. Coronary angioplasty status      Stable monitor          Medications Discontinued During This Encounter   Medication Reason    traMADol (ULTRAM) 50 MG tablet DISCONTINUED BY ANOTHER CLINICIAN         Follow-up and Dispositions    Return in about 6 months (around 4/6/2024). 1/2021  Cardiac status stable. Continue current medical management lab done with PCP blood pressure controlled   8/2021  Stable cardiac status. Blood pressure controlled. Lab done with PCP  6/2022  Transient A. fib noted 11/2021 with acute cholecystitis and infection. .  No recurrence. Stable cardiac status. Blood pressure controlled continue treatment  6/27/2022  Stable cardiac status. No recurrent of atrial fibrillation. Echocardiogram reviewed and discussed with patient, normal LV function,mild concentric hypertrophy. Normal wall motion. Diastolic dysfunction present with normal LV EF. B/P is controlled, continue current medications. OF note, patient is taking Aspirin and Plavix - will monitor due to anemia. 4/2023  Cardiac status stable continue current medical management monitor  10/2023  Cardiac status stable continue current medical management monitor. Wants to continue with aspirin and Plavix at present. Understand risk benefit.

## 2024-03-22 ENCOUNTER — OFFICE VISIT (OUTPATIENT)
Age: 84
End: 2024-03-22
Payer: MEDICARE

## 2024-03-22 VITALS
DIASTOLIC BLOOD PRESSURE: 70 MMHG | WEIGHT: 177 LBS | BODY MASS INDEX: 28.57 KG/M2 | SYSTOLIC BLOOD PRESSURE: 122 MMHG | OXYGEN SATURATION: 96 % | HEART RATE: 59 BPM

## 2024-03-22 DIAGNOSIS — I25.10 CORONARY ARTERY DISEASE WITHOUT ANGINA PECTORIS, UNSPECIFIED VESSEL OR LESION TYPE, UNSPECIFIED WHETHER NATIVE OR TRANSPLANTED HEART: Primary | ICD-10-CM

## 2024-03-22 PROCEDURE — 99214 OFFICE O/P EST MOD 30 MIN: CPT | Performed by: INTERNAL MEDICINE

## 2024-03-22 PROCEDURE — 3074F SYST BP LT 130 MM HG: CPT | Performed by: INTERNAL MEDICINE

## 2024-03-22 PROCEDURE — G8428 CUR MEDS NOT DOCUMENT: HCPCS | Performed by: INTERNAL MEDICINE

## 2024-03-22 PROCEDURE — 1123F ACP DISCUSS/DSCN MKR DOCD: CPT | Performed by: INTERNAL MEDICINE

## 2024-03-22 PROCEDURE — 1036F TOBACCO NON-USER: CPT | Performed by: INTERNAL MEDICINE

## 2024-03-22 PROCEDURE — 3078F DIAST BP <80 MM HG: CPT | Performed by: INTERNAL MEDICINE

## 2024-03-22 PROCEDURE — G8484 FLU IMMUNIZE NO ADMIN: HCPCS | Performed by: INTERNAL MEDICINE

## 2024-03-22 PROCEDURE — G8417 CALC BMI ABV UP PARAM F/U: HCPCS | Performed by: INTERNAL MEDICINE

## 2024-03-22 NOTE — PROGRESS NOTES
[facility]    Flip Calderon is 83 y.o. male     Patient is here today for cardiac evaluation  Patient has known history of coronary disease status post coronary stent in 2005 followed by CABG x 4 in 2005.  Details are not available.  Patient also has history of CVA in 2007  Patient also had episode of transient atrial fibrillation in the setting of cholecystitis and physiologic stress in 2021.    Patient currently denies any chest pain or chest tightness.  He has mild dyspnea on moderate exertion.  He is able to perform active daily living.  He has no presyncope or syncope.  No significant palpitation.  No significant edema  Denies any nausea, vomiting, abdominal pain, fever, chills, sputum production. No hematuria or other bleeding complaints    Past Medical History:   Diagnosis Date    A-fib (HCC) 2021    Paroxysmal atrial fibrillation- Short episode happened during hospital admission during acute cholecystitis and infection. No recurrence    Arthritis     Benign hypertensive heart disease without heart failure     bp stable    CAD (coronary artery disease)     CABG X 4 in 2005, coronary stent 2005    CVA (cerebral vascular accident) (HCC)     x2  Jan 2006    Depression     Diabetes (HCC)     HLD (hyperlipidemia)     HTN (hypertension)     Hyperparathyroidism (HCC)     Insomnia     Kidney stones     Lumbago     Lumbar spinal stenosis     Obesity, unspecified     Patine has increased weight    Poliomyelitis     as a child with resulting right hand deformity    Postsurgical aortocoronary bypass status     Postsurgical percutaneous transluminal coronary angioplasty status     Stent of vein graft to RCA with ANNITA/4/2008    Psychosexual dysfunction     Rosacea     Vitamin D deficiency        Review of Systems:  Cardiac symptoms as noted above in HPI. All others negative.  Denies fatigue, malaise, skin rash, joint pain, blurring vision, photophobia, neck

## 2024-07-25 ENCOUNTER — TELEPHONE (OUTPATIENT)
Age: 84
End: 2024-07-25

## 2024-07-25 NOTE — TELEPHONE ENCOUNTER
----- Message from Chris STEWARD MD sent at 7/22/2024  7:38 AM EDT -----  Please inform patient regarding abnormal test findings.   Please schedule sooner appointment with me to discuss Cath     Thank you  SP

## 2024-09-04 NOTE — PROGRESS NOTES
[facility]    Flip Calderon is 84 y.o. male     Patient recently established care [facility] Dr. Chris Schmidt and is here today for cardiac evaluation.  Patient has known history of CAD s/p coronary stent in  followed by CABG x 4 in .  Details are not available.  Patient also has history of CVA in   Patient also had episode of transient atrial fibrillation in the setting of cholecystitis and physiologic stress in .    When pt saw Dr. Schmidt in 2024, he reported mild dyspnea on moderate exertion.  No c/o chest pain/discomfort.  Nuclear stress test was ordered.  He returns to the office today after recent testing.    Pt reports he has been feeling at usual state of health.  He is able to perform normal activities of daily living.  He has been doing everything to maintain household and yard since his wife  10 years ago.  He takes breaks if working in the yard in the heat.  He denies chest pain/discomfort or new/worsening shortness of breath.  He states he overall feels fine, without recent complaints.    No presyncope or syncope.  No significant palpitation.  No significant edema.    Denies any nausea, vomiting, abdominal pain, fever, chills, sputum production. No hematuria or other bleeding complaints.    Past Medical History:   Diagnosis Date    A-fib ([facility])     Paroxysmal atrial fibrillation- Short episode happened during hospital admission during acute cholecystitis and infection. No recurrence    Arthritis     Benign hypertensive heart disease without heart failure     bp stable    CAD (coronary artery disease)     CABG X 4 in , coronary stent     CVA (cerebral vascular accident) (HCC)     x2  2006    Depression     Diabetes ([facility])     HLD (hyperlipidemia)     HTN (hypertension)     Hyperparathyroidism ([facility])     Insomnia     Kidney stones     Lumbago     Lumbar spinal stenosis     Obesity, unspecified     Patine has

## 2024-09-05 ENCOUNTER — OFFICE VISIT (OUTPATIENT)
Age: 84
End: 2024-09-05
Payer: MEDICARE

## 2024-09-05 VITALS
SYSTOLIC BLOOD PRESSURE: 138 MMHG | OXYGEN SATURATION: 94 % | DIASTOLIC BLOOD PRESSURE: 82 MMHG | WEIGHT: 174 LBS | HEART RATE: 68 BPM | BODY MASS INDEX: 27.97 KG/M2 | HEIGHT: 66 IN

## 2024-09-05 DIAGNOSIS — R94.39 ABNORMAL NUCLEAR STRESS TEST: ICD-10-CM

## 2024-09-05 DIAGNOSIS — I10 PRIMARY HYPERTENSION: ICD-10-CM

## 2024-09-05 DIAGNOSIS — I25.10 CORONARY ARTERY DISEASE INVOLVING NATIVE HEART, UNSPECIFIED VESSEL OR LESION TYPE, UNSPECIFIED WHETHER ANGINA PRESENT: Primary | ICD-10-CM

## 2024-09-05 PROCEDURE — G8428 CUR MEDS NOT DOCUMENT: HCPCS | Performed by: PHYSICIAN ASSISTANT

## 2024-09-05 PROCEDURE — 1036F TOBACCO NON-USER: CPT | Performed by: PHYSICIAN ASSISTANT

## 2024-09-05 PROCEDURE — 1123F ACP DISCUSS/DSCN MKR DOCD: CPT | Performed by: PHYSICIAN ASSISTANT

## 2024-09-05 PROCEDURE — G8417 CALC BMI ABV UP PARAM F/U: HCPCS | Performed by: PHYSICIAN ASSISTANT

## 2024-09-05 PROCEDURE — 3075F SYST BP GE 130 - 139MM HG: CPT | Performed by: PHYSICIAN ASSISTANT

## 2024-09-05 PROCEDURE — 99214 OFFICE O/P EST MOD 30 MIN: CPT | Performed by: PHYSICIAN ASSISTANT

## 2024-09-05 PROCEDURE — 3079F DIAST BP 80-89 MM HG: CPT | Performed by: PHYSICIAN ASSISTANT

## 2024-10-18 ENCOUNTER — OFFICE VISIT (OUTPATIENT)
Age: 84
End: 2024-10-18

## 2024-10-18 VITALS
OXYGEN SATURATION: 95 % | HEART RATE: 57 BPM | SYSTOLIC BLOOD PRESSURE: 130 MMHG | BODY MASS INDEX: 28.25 KG/M2 | WEIGHT: 175 LBS | DIASTOLIC BLOOD PRESSURE: 70 MMHG

## 2024-10-18 DIAGNOSIS — E78.00 PURE HYPERCHOLESTEROLEMIA: ICD-10-CM

## 2024-10-18 DIAGNOSIS — I48.0 PAROXYSMAL ATRIAL FIBRILLATION (HCC): ICD-10-CM

## 2024-10-18 DIAGNOSIS — I25.10 CORONARY ARTERY DISEASE DUE TO LIPID RICH PLAQUE: ICD-10-CM

## 2024-10-18 DIAGNOSIS — I10 ESSENTIAL HYPERTENSION WITH GOAL BLOOD PRESSURE LESS THAN 140/90: ICD-10-CM

## 2024-10-18 DIAGNOSIS — I25.83 CORONARY ARTERY DISEASE DUE TO LIPID RICH PLAQUE: ICD-10-CM

## 2024-10-18 DIAGNOSIS — I10 PRIMARY HYPERTENSION: Primary | ICD-10-CM

## 2024-10-18 NOTE — PROGRESS NOTES
[facility]    Flip Calderon is 84 y.o. male     Here today for follow-up appointment for CAD  Patient has known history of CAD s/p coronary stent in 2005 followed by CABG x 4 with LIMA to LAD, sequential SVG to OM 1 and OM 2 as well as SVG to PDA in 2005.   Patient has LHC in 2008 and had coronary stents x 2 in RCA.  Patient also has history of CVA in 2007  Patient also had episode of transient atrial fibrillation in the setting of cholecystitis and physiologic stress in 2021.  NST in 07/2024 with moderate risk and perfusion defect    Patient will go for cardiac catheterization in the past however patient wanted to think.  He is here today and he said that he want to get cardiac catheterization done.  He feels like he has been slowing down and has been worsening dyspnea.  No chest pain or chest tightness.  No palpitation, presyncope syncope  Denies any nausea, vomiting, abdominal pain, fever, chills, sputum production. No hematuria or other bleeding complaints.    Past Medical History:   Diagnosis Date    A-fib ([facility]) 2021    Paroxysmal atrial fibrillation- Short episode happened during hospital admission during acute cholecystitis and infection. No recurrence    Arthritis     Benign hypertensive heart disease without heart failure     bp stable    CAD (coronary artery disease)     CABG X 4 in 2005, coronary stent 2005    CVA (cerebral vascular accident) ([facility])     x2  Jan 2006    Depression     Diabetes ([facility])     HLD (hyperlipidemia)     HTN (hypertension)     Hyperparathyroidism ([facility])     Insomnia     Kidney stones     Lumbago     Lumbar spinal stenosis     Obesity, unspecified     Patine has increased weight    Poliomyelitis     as a child with resulting right hand deformity    Postsurgical aortocoronary bypass status     Postsurgical percutaneous transluminal coronary angioplasty status     Stent of vein graft to RCA with ANNITA/4/2008    Psychosexual

## 2024-10-21 ENCOUNTER — TELEPHONE (OUTPATIENT)
Age: 84
End: 2024-10-21

## 2024-10-21 NOTE — TELEPHONE ENCOUNTER
Called patient and scheduled LHC with Dr. hCris Schmidt on Oct 29,2024 @ 8:00 am arriving @ 6:45 am.

## 2024-10-21 NOTE — TELEPHONE ENCOUNTER
Heart Catheterization Instructions    Patient’s Name:  Flip Calderon    You are scheduled to have a LHC  on 10/29/24  at 8:00. Please check in at 6:45AM.     Please go to [facility] and park in the outpatient parking lot that is located around to the back of the hospital and enter through the [employer] building.  Once you enter through the [facility] check in with the  there. The  will either give you directions or assist you in getting to the cath holding area.          You are not to eat or drink anything after midnight the night before your procedure. Small sips of water to take your medications is ok.     If you are diabetic, do not take your insulin/sugar pill the morning of the procedure.    MEDICATION INSTRUCTIONS:   Please take your morning medications with the following special instructions:    [x]          Please make sure to take your Blood pressure medication : Losartan and Diltiazem.    [x]          Take your Aspirin and/or Plavix.    We encourage families to wait in the waiting room on the first floor while the procedure is being done.  The Doctor will come out and talk with you as soon as the procedure is over.    There is the possibility that you may spend the night in the hospital, depending on the results of the procedure.  This will be determined after the procedure is done.  If angioplasty or stent is planned, you will stay at least one day.    If you or your family have any questions, please call our office Monday -Friday, 9:00 a.m.-4:30 p.m.,  At [phone], and ask to speak to one of the nurses.

## 2024-10-22 ENCOUNTER — HOSPITAL ENCOUNTER (OUTPATIENT)
Facility: HOSPITAL | Age: 84
Discharge: HOME | End: 2024-10-25
Payer: MEDICARE

## 2024-10-22 DIAGNOSIS — I10 PRIMARY HYPERTENSION: ICD-10-CM

## 2024-10-22 DIAGNOSIS — I48.0 PAROXYSMAL ATRIAL FIBRILLATION (HCC): ICD-10-CM

## 2024-10-22 LAB
ANION GAP SERPL CALC-SCNC: 8 MMOL/L (ref 3–18)
BUN SERPL-MCNC: 23 MG/DL (ref 7–18)
BUN/CREAT SERPL: 17 (ref 12–20)
CALCIUM SERPL-MCNC: 9.7 MG/DL (ref 8.5–10.1)
CHLORIDE SERPL-SCNC: 104 MMOL/L (ref 100–111)
CO2 SERPL-SCNC: 25 MMOL/L (ref 21–32)
CREAT SERPL-MCNC: 1.36 MG/DL (ref 0.6–1.3)
ERYTHROCYTE [DISTWIDTH] IN BLOOD BY AUTOMATED COUNT: 12.8 % (ref 11.6–14.5)
GLUCOSE SERPL-MCNC: 118 MG/DL (ref 74–99)
HCT VFR BLD AUTO: 43.2 % (ref 36–48)
HGB BLD-MCNC: 14.2 G/DL (ref 13–16)
INR PPP: 1 (ref 0.9–1.1)
MCH RBC QN AUTO: 30.7 PG (ref 24–34)
MCHC RBC AUTO-ENTMCNC: 32.9 G/DL (ref 31–37)
MCV RBC AUTO: 93.3 FL (ref 78–100)
NRBC # BLD: 0 K/UL (ref 0–0.01)
NRBC BLD-RTO: 0 PER 100 WBC
PLATELET # BLD AUTO: 223 K/UL (ref 135–420)
PMV BLD AUTO: 9.6 FL (ref 9.2–11.8)
POTASSIUM SERPL-SCNC: 3.5 MMOL/L (ref 3.5–5.5)
PROTHROMBIN TIME: 13.3 SEC (ref 11.9–14.9)
RBC # BLD AUTO: 4.63 M/UL (ref 4.35–5.65)
SODIUM SERPL-SCNC: 137 MMOL/L (ref 136–145)
WBC # BLD AUTO: 6.3 K/UL (ref 4.6–13.2)

## 2024-10-22 PROCEDURE — 85027 COMPLETE CBC AUTOMATED: CPT

## 2024-10-22 PROCEDURE — 80048 BASIC METABOLIC PNL TOTAL CA: CPT

## 2024-10-22 PROCEDURE — 85610 PROTHROMBIN TIME: CPT

## 2024-10-22 PROCEDURE — 36415 COLL VENOUS BLD VENIPUNCTURE: CPT

## 2024-10-22 NOTE — PERIOP NOTE
Pre-procedure instructions for your appointment at  [facility]      Procedure: Cardiac Catheterization  Procedure Date: Tuesday 10/29  Procedure Time: 8:00 am  Physician: Dr. Chris Schmidt    Prior to your scheduled procedure please make sure to do the following:    Make arrangements for an adult relative or friend (18 years or older) to drive you home after your procedure. Cabs or rideshares are not allowed when sedation has been used. You will also need a responsible adult to stay with you for 24 hours afterward    If your doctor gave orders for lab work, make sure to get it done within two weeks prior to your procedure. If any of your results are abnormal we may be able to address them without having to reschedule    Do not eat or drink anything after midnight prior to your procedure. In the morning take ONLY your aspirin, clopidogrel (Plavix), diltiazem (Cardizem), and losartan (Cozaar) with just enough water to swallow them down. You can resume your normal medications after your procedure or at their next scheduled time unless you receive specific instructions otherwise      On the day of your procedure, come to the [facility] located at the back of the [facility] on [facility]. Please arrive by 6:45 am and bring the following with you:     Your photo ID, insurance information, and co-pay (if required)    Any pertinent legal documents such as an Advance Directive, Medical Power of , or DNR (Do Not Resuscitate)    A current list of all the medications and supplements you are taking, including \"prn\" or occasional use meds such as for pain or allergy    If you use one, bring your inhaler    There is a small chance you may need to stay in the hospital overnight so bring a bag with essentials including your CPAP machine if you use one    Contact lenses can be worn to the hospital. Glasses and dentures can be worn but may need to be removed prior to your procedure so

## 2024-10-29 ENCOUNTER — HOSPITAL ENCOUNTER (OUTPATIENT)
Facility: HOSPITAL | Age: 84
Setting detail: OUTPATIENT SURGERY
Discharge: HOME | End: 2024-10-29
Attending: INTERNAL MEDICINE | Admitting: INTERNAL MEDICINE
Payer: MEDICARE

## 2024-10-29 VITALS
RESPIRATION RATE: 16 BRPM | OXYGEN SATURATION: 97 % | TEMPERATURE: 97.6 F | BODY MASS INDEX: 28.45 KG/M2 | DIASTOLIC BLOOD PRESSURE: 64 MMHG | HEART RATE: 47 BPM | SYSTOLIC BLOOD PRESSURE: 146 MMHG | HEIGHT: 66 IN | WEIGHT: 177 LBS

## 2024-10-29 DIAGNOSIS — I25.10 ATHEROSCLEROSIS OF NATIVE CORONARY ARTERY, UNSPECIFIED WHETHER ANGINA PRESENT, UNSPECIFIED WHETHER NATIVE OR TRANSPLANTED HEART: ICD-10-CM

## 2024-10-29 DIAGNOSIS — I48.0 PAROXYSMAL ATRIAL FIBRILLATION (HCC): ICD-10-CM

## 2024-10-29 LAB
ANION GAP BLD CALC-SCNC: ABNORMAL (ref 10–20)
CA-I BLD-MCNC: 1.28 MMOL/L (ref 1.15–1.33)
CHLORIDE BLD-SCNC: 109 MMOL/L (ref 100–111)
CREAT UR-MCNC: 1.1 MG/DL (ref 0.6–1.3)
GLUCOSE BLD STRIP.AUTO-MCNC: 120 MG/DL (ref 74–99)
POTASSIUM BLD-SCNC: 3.5 MMOL/L (ref 3.5–5.5)
SODIUM BLD-SCNC: 143 MMOL/L (ref 136–145)

## 2024-10-29 PROCEDURE — 7100000011 HC PHASE II RECOVERY - ADDTL 15 MIN: Performed by: INTERNAL MEDICINE

## 2024-10-29 PROCEDURE — 2709999900 HC NON-CHARGEABLE SUPPLY: Performed by: INTERNAL MEDICINE

## 2024-10-29 PROCEDURE — 99153 MOD SED SAME PHYS/QHP EA: CPT | Performed by: INTERNAL MEDICINE

## 2024-10-29 PROCEDURE — 76000 FLUOROSCOPY <1 HR PHYS/QHP: CPT | Performed by: INTERNAL MEDICINE

## 2024-10-29 PROCEDURE — 7100000010 HC PHASE II RECOVERY - FIRST 15 MIN: Performed by: INTERNAL MEDICINE

## 2024-10-29 PROCEDURE — 3600000002 HC SURGERY LEVEL 2 BASE: Performed by: INTERNAL MEDICINE

## 2024-10-29 PROCEDURE — 93459 L HRT ART/GRFT ANGIO: CPT | Performed by: INTERNAL MEDICINE

## 2024-10-29 PROCEDURE — 99152 MOD SED SAME PHYS/QHP 5/>YRS: CPT | Performed by: INTERNAL MEDICINE

## 2024-10-29 PROCEDURE — 2500000003 HC RX 250 WO HCPCS: Performed by: INTERNAL MEDICINE

## 2024-10-29 PROCEDURE — 76937 US GUIDE VASCULAR ACCESS: CPT | Performed by: INTERNAL MEDICINE

## 2024-10-29 PROCEDURE — 6360000004 HC RX CONTRAST MEDICATION: Performed by: INTERNAL MEDICINE

## 2024-10-29 PROCEDURE — C1894 INTRO/SHEATH, NON-LASER: HCPCS | Performed by: INTERNAL MEDICINE

## 2024-10-29 PROCEDURE — 3600000017 HC SURGERY HYBRID ADDL 15MIN: Performed by: INTERNAL MEDICINE

## 2024-10-29 PROCEDURE — C1769 GUIDE WIRE: HCPCS | Performed by: INTERNAL MEDICINE

## 2024-10-29 PROCEDURE — 80047 BASIC METABLC PNL IONIZED CA: CPT

## 2024-10-29 PROCEDURE — 3600000012 HC SURGERY LEVEL 2 ADDTL 15MIN: Performed by: INTERNAL MEDICINE

## 2024-10-29 PROCEDURE — C1713 ANCHOR/SCREW BN/BN,TIS/BN: HCPCS | Performed by: INTERNAL MEDICINE

## 2024-10-29 PROCEDURE — 6360000002 HC RX W HCPCS: Performed by: INTERNAL MEDICINE

## 2024-10-29 PROCEDURE — C1760 CLOSURE DEV, VASC: HCPCS | Performed by: INTERNAL MEDICINE

## 2024-10-29 PROCEDURE — 3600000007 HC SURGERY HYBRID BASE: Performed by: INTERNAL MEDICINE

## 2024-10-29 RX ORDER — MIDAZOLAM HYDROCHLORIDE 1 MG/ML
INJECTION, SOLUTION INTRAMUSCULAR; INTRAVENOUS
Status: DISCONTINUED
Start: 2024-10-29 | End: 2024-10-29 | Stop reason: HOSPADM

## 2024-10-29 RX ORDER — SODIUM CHLORIDE 9 MG/ML
INJECTION, SOLUTION INTRAVENOUS PRN
Status: DISCONTINUED | OUTPATIENT
Start: 2024-10-29 | End: 2024-10-29 | Stop reason: HOSPADM

## 2024-10-29 RX ORDER — ACETAMINOPHEN 325 MG/1
650 TABLET ORAL EVERY 4 HOURS PRN
Status: DISCONTINUED | OUTPATIENT
Start: 2024-10-29 | End: 2024-10-29 | Stop reason: HOSPADM

## 2024-10-29 RX ORDER — FENTANYL CITRATE 50 UG/ML
INJECTION, SOLUTION INTRAMUSCULAR; INTRAVENOUS PRN
Status: DISCONTINUED | OUTPATIENT
Start: 2024-10-29 | End: 2024-10-29 | Stop reason: HOSPADM

## 2024-10-29 RX ORDER — CEFAZOLIN SODIUM 1 G/3ML
INJECTION, POWDER, FOR SOLUTION INTRAMUSCULAR; INTRAVENOUS PRN
Status: DISCONTINUED | OUTPATIENT
Start: 2024-10-29 | End: 2024-10-29 | Stop reason: HOSPADM

## 2024-10-29 RX ORDER — LIDOCAINE HYDROCHLORIDE 10 MG/ML
INJECTION, SOLUTION EPIDURAL; INFILTRATION; INTRACAUDAL; PERINEURAL PRN
Status: DISCONTINUED | OUTPATIENT
Start: 2024-10-29 | End: 2024-10-29 | Stop reason: HOSPADM

## 2024-10-29 RX ORDER — FENTANYL CITRATE 50 UG/ML
INJECTION, SOLUTION INTRAMUSCULAR; INTRAVENOUS
Status: DISCONTINUED
Start: 2024-10-29 | End: 2024-10-29 | Stop reason: HOSPADM

## 2024-10-29 RX ORDER — IODIXANOL 320 MG/ML
INJECTION, SOLUTION INTRAVASCULAR PRN
Status: DISCONTINUED | OUTPATIENT
Start: 2024-10-29 | End: 2024-10-29 | Stop reason: HOSPADM

## 2024-10-29 RX ORDER — SODIUM CHLORIDE 0.9 % (FLUSH) 0.9 %
5-40 SYRINGE (ML) INJECTION PRN
Status: DISCONTINUED | OUTPATIENT
Start: 2024-10-29 | End: 2024-10-29 | Stop reason: HOSPADM

## 2024-10-29 RX ORDER — SODIUM CHLORIDE 0.9 % (FLUSH) 0.9 %
5-40 SYRINGE (ML) INJECTION EVERY 12 HOURS SCHEDULED
Status: DISCONTINUED | OUTPATIENT
Start: 2024-10-29 | End: 2024-10-29 | Stop reason: HOSPADM

## 2024-10-29 RX ORDER — MIDAZOLAM HYDROCHLORIDE 1 MG/ML
INJECTION, SOLUTION INTRAMUSCULAR; INTRAVENOUS PRN
Status: DISCONTINUED | OUTPATIENT
Start: 2024-10-29 | End: 2024-10-29 | Stop reason: HOSPADM

## 2024-10-29 RX ORDER — HEPARIN SODIUM 1000 [USP'U]/ML
INJECTION, SOLUTION INTRAVENOUS; SUBCUTANEOUS
Status: DISCONTINUED
Start: 2024-10-29 | End: 2024-10-29 | Stop reason: WASHOUT

## 2024-10-29 RX ORDER — SODIUM CHLORIDE 9 MG/ML
INJECTION, SOLUTION INTRAVENOUS CONTINUOUS
Status: DISCONTINUED | OUTPATIENT
Start: 2024-10-29 | End: 2024-10-29 | Stop reason: HOSPADM

## 2024-10-29 RX ORDER — HEPARIN SODIUM 200 [USP'U]/100ML
INJECTION, SOLUTION INTRAVENOUS
Status: DISCONTINUED
Start: 2024-10-29 | End: 2024-10-29 | Stop reason: HOSPADM

## 2024-10-29 RX ORDER — CEFAZOLIN SODIUM 1 G/3ML
INJECTION, POWDER, FOR SOLUTION INTRAMUSCULAR; INTRAVENOUS
Status: DISCONTINUED
Start: 2024-10-29 | End: 2024-10-29 | Stop reason: HOSPADM

## 2024-10-29 RX ORDER — ASPIRIN 81 MG/1
81 TABLET, CHEWABLE ORAL ONCE
Status: DISCONTINUED | OUTPATIENT
Start: 2024-10-29 | End: 2024-10-29 | Stop reason: HOSPADM

## 2024-10-29 NOTE — H&P
Please see clinic note from 10/18/24 as below for detail.  I saw and examined patient and confirmed above.  No interval change.  Labs reviewed.   Procedure explained to patient and all risk and benefit discussed with patient.  Risk, benefit, complication of LHC and possible PCI ( including but not limited to bleeding, infection, heart failure, stroke, MI, emergent bypass surgery, kidney failure, dialysis and death ) were discussed with patient and willing to proceed with procedure.  Proceed as planned.    History and physical has been reviewed. There have been no significant clinical changes since the completion of the originally dated History and Physical.  Will be using moderate sedation.    ------------------------------------------------------------------------------------------------------------------

## 2024-10-29 NOTE — PRE SEDATION
Sedation Plan  ASA: class 2 - patient with mild systemic disease     Mallampati class: II - soft palate, uvula, fauces visible.    Sedation plan: local anesthesia, minimal sedation and moderate (conscious sedation)    Risks, benefits, and alternatives discussed with patient.  Use of blood products discussed with patient who consented to blood products.         DW daughter as well

## 2024-10-29 NOTE — PROGRESS NOTES
Cath holding summary:    0710: Patient ambulated from waiting area without difficulty, placed on monitor SR with intermittent A-Fib. A&O x4, no c/o pain. NPO since midnight, ID and allergies verified. H&P reviewed, med rec completed. PIV x2 inserted, blood sent to lab. Groin prep completed, consent ready for signature.    0807: Verbal report given to Elodia Calderon being transferred to Cath Lab for ordered procedure. Report consisted of patient's Situation, Background, Assessment and Recommendations (SBAR). Information from the following report(s) Nurse Handoff Report, Intake/Output, MAR, Recent Results, Med Rec Status, Cardiac Rhythm SR with intermittent A-Fib, Pre Procedure Checklist, and Procedure Verification was reviewed with the receiving nurse. Opportunity for questions and clarification was provided.    0923: Verbal report received from Salome Calderon being received from Cath Lab for routine post-op. Report consisted of patient's Situation, Background, Assessment and Recommendations (SBAR). Information from the following report(s) Nurse Handoff Report, Surgery Report, Intake/Output, MAR, Recent Results, Med Rec Status, Cardiac Rhythm Sinus Errol, and Event Log was reviewed with the receiving nurse. Pt A&O x4, no c/o pain. Opportunity for questions and clarification provided.  Procedure: Cardiac Cath  Intervention: No  Site: Right, Groin        1325: AVS Discharge instructions reviewed with patient and copy given to patient.  All questions answered.  Patient verbalized understanding to all discharge instructions, including when to resume all medications prescribed.  PIV removed. Procedural site within normal limits.  No hematoma or bleeding noted from procedural and PIV site. No pain noted at discharge. Patient back to neurological baseline, A&O x4. Patient discharged in the presence of a responsible adult (Daughter) who will accompany patient home and is able to report post procedure

## 2024-10-29 NOTE — DISCHARGE INSTRUCTIONS
Cardiac Catheterization Discharge Instructions    Site Care  Check puncture site frequently for swelling or bleeding. If there is any bleeding, lie down (if access is groin), hold firm pressure with a clean towel or wash cloth. If bleeding doesn't stop, call 911. Notify your doctor for any redness, swelling, drainage, or oozing from the puncture site.   If extremity becomes cold, numb or painful go to the emergency room.   You may remove the bandage from your Right, Groin in 24 hours. You may shower at that point. No hot tubs, bath tubs, or swimming for 1 week.   After shower, pat the incision dry and you can place a clean band-aid over the site.   No lotions, ointments, or powders over puncture site for 1 week.   Use a clean band-aid over the puncture site each day for 5 days. Change band-aid daily.   Cold pack or ice can be placed on the area for 10 to 20 minutes to help with the soreness of the site.     Activity  Activity should be limited for the next 48 hours. Climb as little stairs as possible and avoiding any bending, stooping, or strenuous activity for 48 hours. No heavy lifting (anything heavier than a gallon of milk) for 5 days.   You can walk around the house and do light activity such as cooking.   If procedure was done through the groin, avoid stairs for the first day or two  If the procedure was done through the wrist, do not bend your wrist deeply for the first couple of days. Be careful when getting up from sitting as to not use the affected wrist for the first 48 hours.     Diet  You may resume your usual diet. Drink more fluids than usual to flush kidneys. If you have kidney, heart or liver disease talk with your doctor on fluid limitations  Keep eating a heart healthy diet full of fruits, vegetables, and whole grains.     Medication  Your doctor will tell you when to resume your medications.  If you take blood thinners such as warfarin (Coumadin), rivaroxaban (Xarelto), or apixaban (Eliquis)  smoking and sedentary lifestyle greatly increases your risk for illness      The discharge information has been reviewed with the Patient and Family member.  The Patient and Family member verbalized understanding. Discharge medications reviewed with the Patient and Family member and appropriate educational materials and side effects teaching were provided.

## 2024-12-02 ENCOUNTER — OFFICE VISIT (OUTPATIENT)
Age: 84
End: 2024-12-02
Payer: MEDICARE

## 2024-12-02 VITALS
WEIGHT: 177 LBS | HEART RATE: 56 BPM | DIASTOLIC BLOOD PRESSURE: 50 MMHG | SYSTOLIC BLOOD PRESSURE: 100 MMHG | BODY MASS INDEX: 28.57 KG/M2 | OXYGEN SATURATION: 95 %

## 2024-12-02 DIAGNOSIS — I25.83 CORONARY ARTERY DISEASE DUE TO LIPID RICH PLAQUE: Primary | ICD-10-CM

## 2024-12-02 DIAGNOSIS — I25.10 CORONARY ARTERY DISEASE DUE TO LIPID RICH PLAQUE: Primary | ICD-10-CM

## 2024-12-02 DIAGNOSIS — E78.00 PURE HYPERCHOLESTEROLEMIA: ICD-10-CM

## 2024-12-02 DIAGNOSIS — I10 ESSENTIAL HYPERTENSION WITH GOAL BLOOD PRESSURE LESS THAN 140/90: ICD-10-CM

## 2024-12-02 PROCEDURE — 3074F SYST BP LT 130 MM HG: CPT | Performed by: INTERNAL MEDICINE

## 2024-12-02 PROCEDURE — 1036F TOBACCO NON-USER: CPT | Performed by: INTERNAL MEDICINE

## 2024-12-02 PROCEDURE — 1123F ACP DISCUSS/DSCN MKR DOCD: CPT | Performed by: INTERNAL MEDICINE

## 2024-12-02 PROCEDURE — 99214 OFFICE O/P EST MOD 30 MIN: CPT | Performed by: INTERNAL MEDICINE

## 2024-12-02 PROCEDURE — G8428 CUR MEDS NOT DOCUMENT: HCPCS | Performed by: INTERNAL MEDICINE

## 2024-12-02 PROCEDURE — 3078F DIAST BP <80 MM HG: CPT | Performed by: INTERNAL MEDICINE

## 2024-12-02 PROCEDURE — G8484 FLU IMMUNIZE NO ADMIN: HCPCS | Performed by: INTERNAL MEDICINE

## 2024-12-02 PROCEDURE — 1126F AMNT PAIN NOTED NONE PRSNT: CPT | Performed by: INTERNAL MEDICINE

## 2024-12-02 PROCEDURE — G8417 CALC BMI ABV UP PARAM F/U: HCPCS | Performed by: INTERNAL MEDICINE

## 2024-12-02 NOTE — PROGRESS NOTES
12:22 PM (Final)    Interpretation Summary    Stress Combined Conclusion: Findings suggest a moderate risk of cardiac events.    Stress Function: Post-stress ejection fraction is 70%.    Perfusion Comments: LV perfusion is abnormal.    Perfusion Defect: There is a moderate severity left ventricular stress perfusion defect that is small to medium in size present in the mid to distal inferoseptal, inferior and inferolateral segment(s) that is partially reversible. The possibility of ischemia cannot be excluded.    Perfusion Conclusion: TID ratio is 0.97.    ECG: Resting ECG demonstrates sinus bradycardia.    Stress Test: A pharmacological stress test was performed using regadenoson (Lexiscan). The patient reported no symptoms during the stress test. Hemodynamics are suboptimal due to medication. Blood pressure demonstrated a normal response and heart rate demonstrated a normal response to stress. The patient's heart rate recovery was normal.    TRANSTHORACIC ECHOCARDIOGRAM (TTE) COMPLETE (CONTRAST/BUBBLE/3D PRN) 06/21/2022  9:10 AM, 06/21/2022 12:00 AM (Final)    Left Ventricle: Normal left ventricular systolic function with a visually estimated EF of 55 - 60%. Left ventricle size is normal. Mildly increased wall thickness. Findings consistent with mild concentric hypertrophy. Normal wall motion. Diastolic dysfunction present with normal LV EF.    Aortic Valve: Valve structure is normal. Mild sclerosis of the aortic valve cusp.    Mitral Valve: Valve structure is normal. Mildly calcified subvalvular apparatus.    Left Atrium: Left atrium is mildly dilated. LA Vol Index A/L is 39 mL/m2.    Right Atrium: Right atrium is mildly dilated.    CARDIAC PROCEDURE 10/29/2024  1:35 PM (Final)  Conclusion  LM: Normal  LAD: Proximal and mid sequential 75% stenosis.  Distal LAD fills via LIMA  LCx: Mid vessel hazy 70% stenosis.  OM: Proximal  95% with evidence of competitive flow  RCA: Mid 100% occlusion    Sequential SVG to OM1

## 2025-09-04 ENCOUNTER — OFFICE VISIT (OUTPATIENT)
Age: 85
End: 2025-09-04
Payer: MEDICARE

## 2025-09-04 VITALS
BODY MASS INDEX: 27.64 KG/M2 | SYSTOLIC BLOOD PRESSURE: 110 MMHG | DIASTOLIC BLOOD PRESSURE: 60 MMHG | HEART RATE: 53 BPM | WEIGHT: 172 LBS | OXYGEN SATURATION: 96 % | HEIGHT: 66 IN

## 2025-09-04 DIAGNOSIS — I10 ESSENTIAL HYPERTENSION WITH GOAL BLOOD PRESSURE LESS THAN 140/90: ICD-10-CM

## 2025-09-04 DIAGNOSIS — I25.83 CORONARY ARTERY DISEASE DUE TO LIPID RICH PLAQUE: Primary | ICD-10-CM

## 2025-09-04 DIAGNOSIS — I25.10 CORONARY ARTERY DISEASE DUE TO LIPID RICH PLAQUE: Primary | ICD-10-CM

## 2025-09-04 PROCEDURE — 1126F AMNT PAIN NOTED NONE PRSNT: CPT | Performed by: PHYSICIAN ASSISTANT

## 2025-09-04 PROCEDURE — 1123F ACP DISCUSS/DSCN MKR DOCD: CPT | Performed by: PHYSICIAN ASSISTANT

## 2025-09-04 PROCEDURE — G8417 CALC BMI ABV UP PARAM F/U: HCPCS | Performed by: PHYSICIAN ASSISTANT

## 2025-09-04 PROCEDURE — 3074F SYST BP LT 130 MM HG: CPT | Performed by: PHYSICIAN ASSISTANT

## 2025-09-04 PROCEDURE — 3078F DIAST BP <80 MM HG: CPT | Performed by: PHYSICIAN ASSISTANT

## 2025-09-04 PROCEDURE — 1036F TOBACCO NON-USER: CPT | Performed by: PHYSICIAN ASSISTANT

## 2025-09-04 PROCEDURE — G8428 CUR MEDS NOT DOCUMENT: HCPCS | Performed by: PHYSICIAN ASSISTANT

## 2025-09-04 PROCEDURE — 99213 OFFICE O/P EST LOW 20 MIN: CPT | Performed by: PHYSICIAN ASSISTANT

## (undated) DEVICE — TRAP SPEC COLL POLYP POLYSTYR --

## (undated) DEVICE — CATHETER ANGIO 5FR L100CM GRY S STL NYL JR4 3 SEG BRAID L

## (undated) DEVICE — YANKAUER,SMOOTH HANDLE,HIGH CAPACITY: Brand: MEDLINE INDUSTRIES, INC.

## (undated) DEVICE — PROCEDURE KIT FLUID MGMT 10 FR CUST MAINFOLD

## (undated) DEVICE — SUPPORT WRST COMPR W/ HK MBRACE

## (undated) DEVICE — PADS  DEFIB  ADULT

## (undated) DEVICE — CATHETER 5FR IM CORDIS 100CM

## (undated) DEVICE — SNARE ENDOSCP POLYP 2.4 MM 240 CM 10 MM 2.8 MM CAPTIVATOR

## (undated) DEVICE — GOWN ISOL IMPERV UNIV, DISP, OPEN BACK, BLUE --

## (undated) DEVICE — Device

## (undated) DEVICE — CATHETER SUCT TR FL TIP 14FR W/ O CTRL

## (undated) DEVICE — SYR 10ML LUER LOK 1/5ML GRAD --

## (undated) DEVICE — GAUZE,SPONGE,4"X4",16PLY,STRL,LF,10/TRAY: Brand: MEDLINE

## (undated) DEVICE — ANGIOGRAPHY KIT CUST VASC

## (undated) DEVICE — COVER US PRB W15XL120CM W/ GEL RUBBERBAND TAPE STRP FLD GEN

## (undated) DEVICE — SYR 50ML SLIP TIP NSAF LF STRL --

## (undated) DEVICE — CANNULA ORIG TL CLR W FOAM CUSHIONS AND 14FT SUPL TB 3 CHN

## (undated) DEVICE — FORCEPS BX L240CM JAW DIA2.8MM L CAP W/ NDL MIC MESH TOOTH

## (undated) DEVICE — CATHETER 5FR JL4 CORDIS 100 CM

## (undated) DEVICE — GUIDEWIRE VASC L150CM DIA0.035IN FLX END L7CM J 3MM PTFE

## (undated) DEVICE — ENDOSCOPY PUMP TUBING/ CAP SET: Brand: ERBE

## (undated) DEVICE — ANGIO-SEAL VIP VASCULAR CLOSURE DEVICE: Brand: ANGIO-SEAL

## (undated) DEVICE — CANNULA NSL AD TBNG L14FT STD PVC O2 CRV CONN NONFLARED NSL

## (undated) DEVICE — CATHETER 5FR CORDIS MPA 2 100CM

## (undated) DEVICE — LINER SUCT CANSTR 3000CC PLAS SFT PRE ASSEMB W/OUT TBNG W/

## (undated) DEVICE — SET FLD ADMIN 3 W STPCOCK FIX FEM L BOR 1IN

## (undated) DEVICE — DECANTER BAG 9": Brand: MEDLINE INDUSTRIES, INC.

## (undated) DEVICE — SHEATH 5FR 11CM BRITETIP

## (undated) DEVICE — BITE BLOCK ENDOSCP UNIV AD 6 TO 9.4 MM

## (undated) DEVICE — STERILE POLYISOPRENE POWDER-FREE SURGICAL GLOVES: Brand: PROTEXIS

## (undated) DEVICE — BASIN EMSIS 16OZ GRAPHITE PLAS KID SHP MOLD GRAD FOR ORAL

## (undated) DEVICE — MEDI-VAC NON-CONDUCTIVE SUCTION TUBING: Brand: CARDINAL HEALTH

## (undated) DEVICE — PRESSURE MONITORING SET: Brand: TRUWAVE

## (undated) DEVICE — FCPS RAD JAW 4LC 240CM W/NDL -- BX/20 RADIAL JAW 4

## (undated) DEVICE — SYRINGE MED 25GA 3ML L5/8IN SUBQ PLAS W/ DETACH NDL SFTY

## (undated) DEVICE — SYR 20ML LL STRL LF --

## (undated) DEVICE — SOLUTION IRRIG 1000ML H2O STRL BLT

## (undated) DEVICE — TOWEL,OR,DSP,ST,BLUE,STD,4/PK,20PK/CS: Brand: MEDLINE

## (undated) DEVICE — FLUFF AND POLYMER UNDERPAD,EXTRA HEAVY: Brand: WINGS